# Patient Record
Sex: FEMALE | Race: WHITE | Employment: OTHER | URBAN - METROPOLITAN AREA
[De-identification: names, ages, dates, MRNs, and addresses within clinical notes are randomized per-mention and may not be internally consistent; named-entity substitution may affect disease eponyms.]

---

## 2021-03-25 ENCOUNTER — HOSPITAL ENCOUNTER (INPATIENT)
Age: 66
LOS: 1 days | Discharge: ANOTHER HOSPITAL | DRG: 287 | End: 2021-03-26
Attending: SPECIALIST | Admitting: SPECIALIST
Payer: MEDICARE

## 2021-03-25 ENCOUNTER — APPOINTMENT (OUTPATIENT)
Dept: GENERAL RADIOLOGY | Age: 66
DRG: 287 | End: 2021-03-25
Attending: NURSE PRACTITIONER
Payer: MEDICARE

## 2021-03-25 ENCOUNTER — APPOINTMENT (OUTPATIENT)
Dept: VASCULAR SURGERY | Age: 66
DRG: 287 | End: 2021-03-25
Attending: NURSE PRACTITIONER
Payer: MEDICARE

## 2021-03-25 DIAGNOSIS — R94.39 ABNORMAL STRESS TEST: ICD-10-CM

## 2021-03-25 PROBLEM — I20.0 UNSTABLE ANGINA (HCC): Status: ACTIVE | Noted: 2021-03-25

## 2021-03-25 PROBLEM — I25.10 CAD (CORONARY ARTERY DISEASE): Status: ACTIVE | Noted: 2021-03-25

## 2021-03-25 LAB
APPEARANCE UR: CLEAR
BACTERIA URNS QL MICRO: NEGATIVE /HPF
BILIRUB UR QL: NEGATIVE
COLOR UR: ABNORMAL
EPITH CASTS URNS QL MICRO: ABNORMAL /LPF
GLUCOSE UR STRIP.AUTO-MCNC: NEGATIVE MG/DL
HGB UR QL STRIP: ABNORMAL
HYALINE CASTS URNS QL MICRO: ABNORMAL /LPF (ref 0–5)
KETONES UR QL STRIP.AUTO: NEGATIVE MG/DL
LEUKOCYTE ESTERASE UR QL STRIP.AUTO: ABNORMAL
NITRITE UR QL STRIP.AUTO: NEGATIVE
PH UR STRIP: 5 [PH] (ref 5–8)
PROT UR STRIP-MCNC: NEGATIVE MG/DL
RBC #/AREA URNS HPF: ABNORMAL /HPF (ref 0–5)
SP GR UR REFRACTOMETRY: 1.03 (ref 1–1.03)
UA: UC IF INDICATED,UAUC: ABNORMAL
UROBILINOGEN UR QL STRIP.AUTO: 0.2 EU/DL (ref 0.2–1)
WBC URNS QL MICRO: ABNORMAL /HPF (ref 0–4)

## 2021-03-25 PROCEDURE — 99152 MOD SED SAME PHYS/QHP 5/>YRS: CPT | Performed by: SPECIALIST

## 2021-03-25 PROCEDURE — 81001 URINALYSIS AUTO W/SCOPE: CPT

## 2021-03-25 PROCEDURE — 75756 ARTERY X-RAYS CHEST: CPT | Performed by: SPECIALIST

## 2021-03-25 PROCEDURE — B2151ZZ FLUOROSCOPY OF LEFT HEART USING LOW OSMOLAR CONTRAST: ICD-10-PCS | Performed by: SPECIALIST

## 2021-03-25 PROCEDURE — 93458 L HRT ARTERY/VENTRICLE ANGIO: CPT | Performed by: SPECIALIST

## 2021-03-25 PROCEDURE — 74011000636 HC RX REV CODE- 636: Performed by: SPECIALIST

## 2021-03-25 PROCEDURE — 99153 MOD SED SAME PHYS/QHP EA: CPT | Performed by: SPECIALIST

## 2021-03-25 PROCEDURE — 77030004532 HC CATH ANGI DX IMP BSC -A: Performed by: SPECIALIST

## 2021-03-25 PROCEDURE — 99218 HC RM OBSERVATION: CPT

## 2021-03-25 PROCEDURE — C1894 INTRO/SHEATH, NON-LASER: HCPCS | Performed by: SPECIALIST

## 2021-03-25 PROCEDURE — 71046 X-RAY EXAM CHEST 2 VIEWS: CPT

## 2021-03-25 PROCEDURE — 65660000000 HC RM CCU STEPDOWN

## 2021-03-25 PROCEDURE — 74011250636 HC RX REV CODE- 250/636: Performed by: SPECIALIST

## 2021-03-25 PROCEDURE — C1760 CLOSURE DEV, VASC: HCPCS | Performed by: SPECIALIST

## 2021-03-25 PROCEDURE — 4A023N7 MEASUREMENT OF CARDIAC SAMPLING AND PRESSURE, LEFT HEART, PERCUTANEOUS APPROACH: ICD-10-PCS | Performed by: SPECIALIST

## 2021-03-25 PROCEDURE — 77030003390 HC NDL ANGI MRTM -A: Performed by: SPECIALIST

## 2021-03-25 PROCEDURE — 93880 EXTRACRANIAL BILAT STUDY: CPT

## 2021-03-25 PROCEDURE — B2111ZZ FLUOROSCOPY OF MULTIPLE CORONARY ARTERIES USING LOW OSMOLAR CONTRAST: ICD-10-PCS | Performed by: SPECIALIST

## 2021-03-25 RX ORDER — SODIUM CHLORIDE 0.9 % (FLUSH) 0.9 %
5-40 SYRINGE (ML) INJECTION EVERY 8 HOURS
Status: DISCONTINUED | OUTPATIENT
Start: 2021-03-25 | End: 2021-03-26 | Stop reason: HOSPADM

## 2021-03-25 RX ORDER — ONDANSETRON 2 MG/ML
4 INJECTION INTRAMUSCULAR; INTRAVENOUS
Status: CANCELLED | OUTPATIENT
Start: 2021-03-25 | End: 2021-03-26

## 2021-03-25 RX ORDER — HYDROCORTISONE SODIUM SUCCINATE 100 MG/2ML
100 INJECTION, POWDER, FOR SOLUTION INTRAMUSCULAR; INTRAVENOUS
Status: DISCONTINUED | OUTPATIENT
Start: 2021-03-25 | End: 2021-03-26 | Stop reason: HOSPADM

## 2021-03-25 RX ORDER — DIPHENHYDRAMINE HYDROCHLORIDE 50 MG/ML
25 INJECTION, SOLUTION INTRAMUSCULAR; INTRAVENOUS
Status: DISCONTINUED | OUTPATIENT
Start: 2021-03-25 | End: 2021-03-26 | Stop reason: HOSPADM

## 2021-03-25 RX ORDER — ZOLPIDEM TARTRATE 5 MG/1
5 TABLET ORAL
Status: CANCELLED | OUTPATIENT
Start: 2021-03-25

## 2021-03-25 RX ORDER — GUAIFENESIN 100 MG/5ML
81 LIQUID (ML) ORAL DAILY
COMMUNITY

## 2021-03-25 RX ORDER — FENTANYL CITRATE 50 UG/ML
INJECTION, SOLUTION INTRAMUSCULAR; INTRAVENOUS AS NEEDED
Status: DISCONTINUED | OUTPATIENT
Start: 2021-03-25 | End: 2021-03-25 | Stop reason: HOSPADM

## 2021-03-25 RX ORDER — MIDAZOLAM HYDROCHLORIDE 1 MG/ML
INJECTION, SOLUTION INTRAMUSCULAR; INTRAVENOUS AS NEEDED
Status: DISCONTINUED | OUTPATIENT
Start: 2021-03-25 | End: 2021-03-25 | Stop reason: HOSPADM

## 2021-03-25 RX ORDER — SODIUM CHLORIDE 9 MG/ML
75 INJECTION, SOLUTION INTRAVENOUS ONCE
Status: DISPENSED | OUTPATIENT
Start: 2021-03-25 | End: 2021-03-25

## 2021-03-25 RX ORDER — SODIUM CHLORIDE 0.9 % (FLUSH) 0.9 %
5-40 SYRINGE (ML) INJECTION AS NEEDED
Status: DISCONTINUED | OUTPATIENT
Start: 2021-03-25 | End: 2021-03-26 | Stop reason: HOSPADM

## 2021-03-25 RX ORDER — HEPARIN SODIUM 200 [USP'U]/100ML
INJECTION, SOLUTION INTRAVENOUS
Status: COMPLETED | OUTPATIENT
Start: 2021-03-25 | End: 2021-03-25

## 2021-03-25 RX ORDER — HYDROMORPHONE HYDROCHLORIDE 1 MG/ML
1 INJECTION, SOLUTION INTRAMUSCULAR; INTRAVENOUS; SUBCUTANEOUS
Status: DISCONTINUED | OUTPATIENT
Start: 2021-03-25 | End: 2021-03-26 | Stop reason: HOSPADM

## 2021-03-25 RX ORDER — ZOLPIDEM TARTRATE 5 MG/1
5 TABLET ORAL
Status: DISCONTINUED | OUTPATIENT
Start: 2021-03-25 | End: 2021-03-26 | Stop reason: HOSPADM

## 2021-03-25 RX ORDER — ACETAMINOPHEN 325 MG/1
650 TABLET ORAL
Status: DISCONTINUED | OUTPATIENT
Start: 2021-03-25 | End: 2021-03-26 | Stop reason: HOSPADM

## 2021-03-25 RX ORDER — ACETAMINOPHEN 325 MG/1
650 TABLET ORAL
Status: CANCELLED | OUTPATIENT
Start: 2021-03-25

## 2021-03-25 RX ORDER — HYDROMORPHONE HYDROCHLORIDE 1 MG/ML
1 INJECTION, SOLUTION INTRAMUSCULAR; INTRAVENOUS; SUBCUTANEOUS
Status: CANCELLED | OUTPATIENT
Start: 2021-03-25

## 2021-03-25 RX ORDER — SODIUM CHLORIDE 9 MG/ML
75 INJECTION, SOLUTION INTRAVENOUS CONTINUOUS
Status: DISPENSED | OUTPATIENT
Start: 2021-03-25 | End: 2021-03-25

## 2021-03-25 RX ORDER — CHLORHEXIDINE GLUCONATE 1.2 MG/ML
15 RINSE ORAL EVERY 12 HOURS
Status: CANCELLED | OUTPATIENT
Start: 2021-03-27

## 2021-03-25 RX ORDER — METOPROLOL TARTRATE 25 MG/1
25 TABLET, FILM COATED ORAL 2 TIMES DAILY
Status: ON HOLD | COMMUNITY
End: 2021-04-05 | Stop reason: SDUPTHER

## 2021-03-25 RX ORDER — SODIUM CHLORIDE 0.9 % (FLUSH) 0.9 %
5-40 SYRINGE (ML) INJECTION AS NEEDED
Status: CANCELLED | OUTPATIENT
Start: 2021-03-25

## 2021-03-25 RX ORDER — ONDANSETRON 2 MG/ML
4 INJECTION INTRAMUSCULAR; INTRAVENOUS
Status: DISCONTINUED | OUTPATIENT
Start: 2021-03-25 | End: 2021-03-26 | Stop reason: HOSPADM

## 2021-03-25 RX ORDER — ATORVASTATIN CALCIUM 20 MG/1
40 TABLET, FILM COATED ORAL DAILY
COMMUNITY
End: 2021-06-24

## 2021-03-25 RX ORDER — SODIUM CHLORIDE 0.9 % (FLUSH) 0.9 %
5-40 SYRINGE (ML) INJECTION EVERY 8 HOURS
Status: CANCELLED | OUTPATIENT
Start: 2021-03-25

## 2021-03-25 RX ORDER — AMIODARONE HYDROCHLORIDE 200 MG/1
400 TABLET ORAL EVERY 12 HOURS
Status: CANCELLED | OUTPATIENT
Start: 2021-03-25

## 2021-03-25 RX ADMIN — SODIUM CHLORIDE 75 ML/HR: 9 INJECTION, SOLUTION INTRAVENOUS at 20:15

## 2021-03-25 NOTE — PROGRESS NOTES
11:48 AM  Patient arrived. ID and allergies verified verbally with patient. Pt voices understanding of procedure to be performed. Consent obtained. Pt prepped for procedure. 1:02 PM  TRANSFER - OUT REPORT:    Verbal report given to George Garcia RN on [facility]  being transferred to Cath Lab for ordered procedure       Report consisted of patients Situation, Background, Assessment and   Recommendations(SBAR). Information from the following report(s) SBAR was reviewed with the receiving nurse. Lines:   Peripheral IV 03/25/21 Left Antecubital (Active)   Site Assessment Clean, dry, & intact 03/25/21 1202   Phlebitis Assessment 0 03/25/21 1202   Infiltration Assessment 0 03/25/21 1202   Dressing Status Clean, dry, & intact 03/25/21 1202   Dressing Type Transparent 03/25/21 1202   Hub Color/Line Status Pink 03/25/21 1202        Opportunity for questions and clarification was provided. Patient transported with:   Registered Nurse      1:38 PM  TRANSFER - IN REPORT:    Verbal report received from Eden Arzola, [facility] on [facility]  being received from Cath Lab for routine progression of care      Report consisted of patients Situation, Background, Assessment and   Recommendations(SBAR). Information from the following report(s) SBAR was reviewed with the receiving nurse. Opportunity for questions and clarification was provided. Assessment completed upon patients arrival to unit and care assumed. 4:00 PM  30 minute pre-call given to [facility] charge nurse. 4:05PM  Over the phone verbal report given to [address] [facility]. 4:20 PM  TRANSFER - OUT REPORT:    Verbal report given to [address], RN on [facility]  being transferred to [facility] room 325 for routine progression of care       Report consisted of patients Situation, Background, Assessment and   Recommendations(SBAR). Information from the following report(s) SBAR was reviewed with the receiving nurse.     Lines:   Peripheral IV 03/25/21 Left Antecubital (Active)   Site Assessment Clean, dry, & intact 03/25/21 1202   Phlebitis Assessment 0 03/25/21 1202   Infiltration Assessment 0 03/25/21 1202   Dressing Status Clean, dry, & intact 03/25/21 1202   Dressing Type Transparent 03/25/21 1202   Hub Color/Line Status Pink 03/25/21 1202        Opportunity for questions and clarification was provided.       Patient transported with:   Registered Nurse

## 2021-03-25 NOTE — H&P
Date of Surgery Update:  Juan Johnson was seen and examined. History and physical has been reviewed. The patient has been examined. There have been no significant clinical changes since the completion of the originally dated History and Physical.    Signed By: Carla Bone MD     March 25, 2021 12:16 PM       No CP, + SOB, palps  Exercise Echo (3/17/21):  Sept. Inf, post HK at rest and with peak. Viri Rosado  1955   Office/Outpatient Visit  Visit Date: Jeneane Gaucher 03:50 pm  Provider: Radames Eubanks MD   [facility]: [facility] ([facility])- [address] [place] [address] Kavya Alves. [id number] [phone]    Electronically signed by Darinel Nickerson MD on  03/17/2021 04:09:11 PM                           Subjective:    CC: Ms. Caitlin Leonard is a 76year old female. Her primary care physician is Corinna Laboy NP. She is here to follow up with the doctor regarding previous testing, stress echocardiogram - 3/17/21 which was abnormal.      HPI:       Cardiac murmur, unspecified noted. Coronary Artery Disease: Current symptoms include shortness of breath. MD Notes: Stress echo today showed septal, inferior, and posterior hypokinesis. EF 40%. Abnormal result of other cardiovascular function study noted. MD Notes: 3/10/21 Sinus, inferior Q and T wave inversion. Abnormal electrocardiogram [ECG] [EKG] noted. Paplitations/Arrhythmias:  Regarding cardiac arrhythmia (unspecified): She describes the sensation as occasional skipped beats. She cannot estimate the frequency of symptoms. The average duration of each episode is a few seconds. She states that it is worsened after exertion. Nothing seems to alleviate the palpitations. Associated symptoms include shortness of breath. She denies dizziness or syncope.     Past Medical History / Family History / Social History:     Last Reviewed on 3/17/2021 04:07 PM by Jennifer Williamson  Past Medical History:     Hyperlipidemia Surgical History:   Surgical/Procedural History:   Cholecystectomy     Family History:   Father:    Mother: ;  arrhythmia (Afib); congestive heart failure; coronary artery disease; hypertension   Brother(s): Healthy; 2 brother(s) total     Social History:   Social History:   Occupation: Retired (Prior occupation: Retail)   Marital Status:    Children: 2 children     Tobacco/Alcohol/Supplements:   Last Reviewed on 3/17/2021 04:07 PM by Sea Contreras  TOBACCO/ALCOHOL/SUPPLEMENTS   Tobacco: She has never smoked. Alcohol: Non-drinker   Caffeine:  She admits to consuming caffeine via soda ( 3 servings per week ). Substance Abuse History:   Last Reviewed on 3/17/2021 04:07 PM by Sea Contreras  Substance Use/Abuse:   None     Mental Health History:   Last Reviewed on 3/17/2021 04:07 PM by Rekha Santoro (eg STDs): Last Reviewed on 3/17/2021 04:07 PM by Sea Contreras    Current Problems:   Last Reviewed on 3/17/2021 04:07 PM by Sea Contreras  Abnormal electrocardiogram [ECG] [EKG]  Cardiac murmur, unspecified  Shortness of breath  Other hyperlipidemia  Palpitations  Cardiac murmur  Shortness of breath  Palpitations  Atherosclerotic heart disease of native coronary artery with other forms of angina pectoris  Abnormal result of other cardiovascular function study    Allergies:   Last Reviewed on 3/17/2021 04:07 PM by Sea Contreras  No Known Allergies.     Current Medications:   Last Reviewed on 3/17/2021 04:07 PM by Sea Contreras  multivitamin oral tablet  atorvastatin 20 mg oral tablet [take 1 tablet (20 mg) by oral route once daily]  aspirin 81 mg oral tablet,chewable [chew 1 tablet (81 mg) by oral route once daily]  metoprolol tartrate 25 mg oral tablet [take 1 tablet (25 mg) by oral route 2 times per day]    Objective:    Vitals:     Current: 3/17/2021 3:59:04 PM  Ht:  5 ft, 4 inBP: 150/82 mm Hg (left arm, sitting);  P: 84 bpm    Exams:   GENERAL:  Alert, oriented to person, place and time. HEENT:  Pinkish palpebral  conjunctivae. Anicteric sclerae. NECK:  No jugular vein engorgement. No bruit. CHEST: Equal expansion. Clear breath sounds. No rales, no wheezing. Heart: Reg rate and rhythm. Grade 2/6 systolic ejection murmur at the left sternal border area and at the aortic area. ABDOMEN:  Soft. Normal active bowel sounds. No tenderness. EXTREMITIES:  No pitting pedal edema. Equal pulses bilaterally. NEURO:  Grossly intact. Assessment:     R01.1   Cardiac murmur, unspecified     I25.118   Atherosclerotic heart disease of native coronary artery with other forms of angina pectoris     R94.39   Abnormal result of other cardiovascular function study     R94.31   Abnormal electrocardiogram [ECG] [EKG]     R00.2   Palpitations       ORDERS:     Procedures Ordered:     [id number]  Education and train for pt self-mgmt by qualified, nonphysician, jim 30 minutes; individual pt  (Send-Out)          XCATH  Cardiac Cath  (In-House)          Other Orders:     [id number]  Queried Patient for Tobacco Use  (Send-Out)              Plan:     Atherosclerotic heart disease of native coronary artery with other forms of angina pectoris  1. Medication list has been reviewed. Start the following medication(s):  Aspirin 81 mg. Take one tablet once daily with food. Metoprolol. .      Smoking Status:  Nonsmoker   2. Advised the patient regarding diet, exercise, and lifestyle modification. 3.  The patient to call the office if there is any change in her cardiac symptoms. 4.  Explained to the patient the importance of controlling her cardiac risk factors. Testing/Procedures: Cardiac Catheterization  Explained to the patient the indication, procedure, risks, and benefits of cardiac catheterization. The patient understands  and wishes to proceed with the cath to be performed as an outpatient in one week at [facility] by Dr. Hardik Tiwari.       Schedule a follow up appointment in 2 weeks. The above note was transcribed by Bridger Myers and authenticated by Dr. Desi Navarro prior to sign off.

## 2021-03-25 NOTE — PROGRESS NOTES
CSS update: CSS: pending transfer to [address] [place] [address] for surgical workup and treatment. Will order some of preop workup at [facility] while awaiting for bed assignment. Signed & held orders are for [address] [place] [address] admit, please do not release. Full note to follow at [address] [place] [address].

## 2021-03-25 NOTE — CARDIO/PULMONARY
[facility] Cardiopulmonary Rehab: 75 yo female admitted for cardiac cath (3/25). S/P cath with obstructive 2VD. LVEF 40% by stress echo (3/17/21). Cardiologist is Dr Arianna Berrios.     Met with patient on CLPO. Her sister-in-law was also present. Pt reported she relocated from [place], four weeks ago. She is a retired  with [employer]. Pt is living with her brother & sister-in-law in [place]. CAD risk factors: HLD, family hx, and post-menopausal.  Pt aware of plan for transfer to [facility]. She indicated she was feeling overwhelmed with the surprise of needing surgery. Offered emotional support. Briefly discussed importance of outpatient cardiac rehab program. Pt expressed interest in participating in program at [facility].

## 2021-03-25 NOTE — Clinical Note
TRANSFER - IN REPORT:     Verbal report received from: SILVESTRE. Report consisted of patient's Situation, Background, Assessment and   Recommendations(SBAR). Opportunity for questions and clarification was provided. Assessment completed upon patient's arrival to unit and care assumed. Patient transported with a Registered Nurse.

## 2021-03-25 NOTE — Clinical Note
TRANSFER - OUT REPORT:     Verbal report given to: Yulisa Ca. Report consisted of patient's Situation, Background, Assessment and   Recommendations(SBAR). Opportunity for questions and clarification was provided. Patient transported with a Registered Nurse. Patient transported to: Gigi Drew.

## 2021-03-25 NOTE — PROCEDURES
Cath:  Obstructive 2VD     LM 30     LAD m100; D1 90     OM2 50     RCA ost50, p95, N953  Mild LV systolic dysfunction     EF 45%     Inferobasal AK  No AVG/MR  Patent L SC.  RFA angioseal.    CTSx consult for CABG

## 2021-03-26 ENCOUNTER — HOSPITAL ENCOUNTER (INPATIENT)
Age: 66
LOS: 10 days | Discharge: HOME HEALTH | DRG: 236 | End: 2021-04-05
Attending: THORACIC SURGERY (CARDIOTHORACIC VASCULAR SURGERY) | Admitting: THORACIC SURGERY (CARDIOTHORACIC VASCULAR SURGERY)
Payer: MEDICARE

## 2021-03-26 ENCOUNTER — APPOINTMENT (OUTPATIENT)
Dept: GENERAL RADIOLOGY | Age: 66
DRG: 236 | End: 2021-03-26
Attending: THORACIC SURGERY (CARDIOTHORACIC VASCULAR SURGERY)
Payer: MEDICARE

## 2021-03-26 ENCOUNTER — APPOINTMENT (OUTPATIENT)
Dept: GENERAL RADIOLOGY | Age: 66
DRG: 236 | End: 2021-03-26
Attending: NURSE PRACTITIONER
Payer: MEDICARE

## 2021-03-26 VITALS
OXYGEN SATURATION: 94 % | WEIGHT: 163.1 LBS | RESPIRATION RATE: 16 BRPM | HEART RATE: 69 BPM | DIASTOLIC BLOOD PRESSURE: 68 MMHG | HEIGHT: 64 IN | BODY MASS INDEX: 27.84 KG/M2 | SYSTOLIC BLOOD PRESSURE: 120 MMHG | TEMPERATURE: 98.4 F

## 2021-03-26 DIAGNOSIS — I25.10 CORONARY ARTERY DISEASE DUE TO LIPID RICH PLAQUE: ICD-10-CM

## 2021-03-26 DIAGNOSIS — R73.9 HYPERGLYCEMIA: ICD-10-CM

## 2021-03-26 DIAGNOSIS — I25.119 CORONARY ARTERY DISEASE INVOLVING NATIVE HEART WITH ANGINA PECTORIS, UNSPECIFIED VESSEL OR LESION TYPE (HCC): ICD-10-CM

## 2021-03-26 DIAGNOSIS — I25.83 CORONARY ARTERY DISEASE DUE TO LIPID RICH PLAQUE: ICD-10-CM

## 2021-03-26 PROBLEM — E78.5 HLD (HYPERLIPIDEMIA): Status: ACTIVE | Noted: 2021-03-26

## 2021-03-26 LAB
ALBUMIN SERPL-MCNC: 3.3 G/DL (ref 3.5–5)
ALBUMIN/GLOB SERPL: 1.1 {RATIO} (ref 1.1–2.2)
ALP SERPL-CCNC: 58 U/L (ref 45–117)
ALT SERPL-CCNC: 24 U/L (ref 12–78)
ANION GAP SERPL CALC-SCNC: 5 MMOL/L (ref 5–15)
APPEARANCE UR: CLEAR
APTT PPP: 26.2 SEC (ref 22.1–31)
AST SERPL-CCNC: 15 U/L (ref 15–37)
ATRIAL RATE: 0 BPM
ATRIAL RATE: 62 BPM
BACTERIA URNS QL MICRO: ABNORMAL /HPF
BILIRUB SERPL-MCNC: 0.5 MG/DL (ref 0.2–1)
BILIRUB UR QL: NEGATIVE
BNP SERPL-MCNC: 367 PG/ML
BUN SERPL-MCNC: 17 MG/DL (ref 6–20)
BUN/CREAT SERPL: 25 (ref 12–20)
CALCIUM SERPL-MCNC: 8.2 MG/DL (ref 8.5–10.1)
CALCULATED P AXIS, ECG09: 45 DEGREES
CALCULATED R AXIS, ECG10: -15 DEGREES
CALCULATED R AXIS, ECG10: 0 DEGREES
CALCULATED T AXIS, ECG11: 0 DEGREES
CALCULATED T AXIS, ECG11: 13 DEGREES
CHLORIDE SERPL-SCNC: 110 MMOL/L (ref 97–108)
CO2 SERPL-SCNC: 26 MMOL/L (ref 21–32)
COLOR UR: ABNORMAL
CREAT SERPL-MCNC: 0.68 MG/DL (ref 0.55–1.02)
DIAGNOSIS, 93000: NORMAL
DIAGNOSIS, 93000: NORMAL
EPITH CASTS URNS QL MICRO: ABNORMAL /LPF
ERYTHROCYTE [DISTWIDTH] IN BLOOD BY AUTOMATED COUNT: 13.1 % (ref 11.5–14.5)
EST. AVERAGE GLUCOSE BLD GHB EST-MCNC: 103 MG/DL
GLOBULIN SER CALC-MCNC: 3.1 G/DL (ref 2–4)
GLUCOSE SERPL-MCNC: 94 MG/DL (ref 65–100)
GLUCOSE UR STRIP.AUTO-MCNC: NEGATIVE MG/DL
HBA1C MFR BLD: 5.2 % (ref 4–5.6)
HCT VFR BLD AUTO: 40.1 % (ref 35–47)
HGB BLD-MCNC: 13.1 G/DL (ref 11.5–16)
HGB UR QL STRIP: NEGATIVE
INR PPP: 1.1 (ref 0.9–1.1)
KETONES UR QL STRIP.AUTO: 15 MG/DL
LEFT CCA DIST DIAS: 33.1 CM/S
LEFT CCA DIST SYS: 93.9 CM/S
LEFT CCA PROX DIAS: 28 CM/S
LEFT CCA PROX SYS: 101.7 CM/S
LEFT ECA DIAS: 16.3 CM/S
LEFT ECA SYS: 105.6 CM/S
LEFT ICA DIST DIAS: 35.7 CM/S
LEFT ICA DIST SYS: 83.6 CM/S
LEFT ICA MID DIAS: 38.3 CM/S
LEFT ICA MID SYS: 91.3 CM/S
LEFT ICA PROX DIAS: 26.7 CM/S
LEFT ICA PROX SYS: 79.7 CM/S
LEFT ICA/CCA SYS: 0.97
LEFT SUBCLAVIAN DIAS: 0 CM/S
LEFT SUBCLAVIAN SYS: 162.4 CM/S
LEFT VERTEBRAL DIAS: 14.48 CM/S
LEFT VERTEBRAL SYS: 54.6 CM/S
LEUKOCYTE ESTERASE UR QL STRIP.AUTO: ABNORMAL
MAGNESIUM SERPL-MCNC: 2.2 MG/DL (ref 1.6–2.4)
MCH RBC QN AUTO: 29.4 PG (ref 26–34)
MCHC RBC AUTO-ENTMCNC: 32.7 G/DL (ref 30–36.5)
MCV RBC AUTO: 89.9 FL (ref 80–99)
NITRITE UR QL STRIP.AUTO: NEGATIVE
NRBC # BLD: 0 K/UL (ref 0–0.01)
NRBC BLD-RTO: 0 PER 100 WBC
P-R INTERVAL, ECG05: 164 MS
PH UR STRIP: 5 [PH] (ref 5–8)
PLATELET # BLD AUTO: 174 K/UL (ref 150–400)
PMV BLD AUTO: 9.5 FL (ref 8.9–12.9)
POTASSIUM SERPL-SCNC: 3.8 MMOL/L (ref 3.5–5.1)
PROT SERPL-MCNC: 6.4 G/DL (ref 6.4–8.2)
PROT UR STRIP-MCNC: NEGATIVE MG/DL
PROTHROMBIN TIME: 10.9 SEC (ref 9–11.1)
Q-T INTERVAL, ECG07: 0 MS
Q-T INTERVAL, ECG07: 422 MS
QRS DURATION, ECG06: 0 MS
QRS DURATION, ECG06: 94 MS
QTC CALCULATION (BEZET), ECG08: 0 MS
QTC CALCULATION (BEZET), ECG08: 428 MS
RBC # BLD AUTO: 4.46 M/UL (ref 3.8–5.2)
RBC #/AREA URNS HPF: ABNORMAL /HPF (ref 0–5)
RIGHT CCA DIST DIAS: 36.1 CM/S
RIGHT CCA DIST SYS: 94.3 CM/S
RIGHT CCA PROX DIAS: 17.5 CM/S
RIGHT CCA PROX SYS: 100.3 CM/S
RIGHT ECA DIAS: 12.42 CM/S
RIGHT ECA SYS: 94 CM/S
RIGHT ICA DIST DIAS: 34.4 CM/S
RIGHT ICA DIST SYS: 84.9 CM/S
RIGHT ICA MID DIAS: 43.5 CM/S
RIGHT ICA MID SYS: 104.3 CM/S
RIGHT ICA PROX DIAS: 29.3 CM/S
RIGHT ICA PROX SYS: 73.8 CM/S
RIGHT ICA/CCA SYS: 1.1
RIGHT SUBCLAVIAN DIAS: 0 CM/S
RIGHT SUBCLAVIAN SYS: 122 CM/S
RIGHT VERTEBRAL DIAS: 19.64 CM/S
RIGHT VERTEBRAL SYS: 57.4 CM/S
SODIUM SERPL-SCNC: 141 MMOL/L (ref 136–145)
SP GR UR REFRACTOMETRY: 1.02 (ref 1–1.03)
THERAPEUTIC RANGE,PTTT: NORMAL SECS (ref 58–77)
TSH SERPL DL<=0.05 MIU/L-ACNC: 2.76 UIU/ML (ref 0.36–3.74)
UA: UC IF INDICATED,UAUC: ABNORMAL
UROBILINOGEN UR QL STRIP.AUTO: 1 EU/DL (ref 0.2–1)
VENTRICULAR RATE, ECG03: 0 BPM
VENTRICULAR RATE, ECG03: 62 BPM
WBC # BLD AUTO: 5.8 K/UL (ref 3.6–11)
WBC URNS QL MICRO: ABNORMAL /HPF (ref 0–4)

## 2021-03-26 PROCEDURE — 83880 ASSAY OF NATRIURETIC PEPTIDE: CPT

## 2021-03-26 PROCEDURE — 83036 HEMOGLOBIN GLYCOSYLATED A1C: CPT

## 2021-03-26 PROCEDURE — 77030027138 HC INCENT SPIROMETER -A

## 2021-03-26 PROCEDURE — 83735 ASSAY OF MAGNESIUM: CPT

## 2021-03-26 PROCEDURE — 85610 PROTHROMBIN TIME: CPT

## 2021-03-26 PROCEDURE — 85730 THROMBOPLASTIN TIME PARTIAL: CPT

## 2021-03-26 PROCEDURE — 80053 COMPREHEN METABOLIC PANEL: CPT

## 2021-03-26 PROCEDURE — 85027 COMPLETE CBC AUTOMATED: CPT

## 2021-03-26 PROCEDURE — 74011250637 HC RX REV CODE- 250/637: Performed by: NURSE PRACTITIONER

## 2021-03-26 PROCEDURE — 65660000001 HC RM ICU INTERMED STEPDOWN

## 2021-03-26 PROCEDURE — 81001 URINALYSIS AUTO W/SCOPE: CPT

## 2021-03-26 PROCEDURE — 71046 X-RAY EXAM CHEST 2 VIEWS: CPT

## 2021-03-26 PROCEDURE — 84443 ASSAY THYROID STIM HORMONE: CPT

## 2021-03-26 PROCEDURE — 36415 COLL VENOUS BLD VENIPUNCTURE: CPT

## 2021-03-26 PROCEDURE — 99223 1ST HOSP IP/OBS HIGH 75: CPT | Performed by: THORACIC SURGERY (CARDIOTHORACIC VASCULAR SURGERY)

## 2021-03-26 PROCEDURE — 93005 ELECTROCARDIOGRAM TRACING: CPT

## 2021-03-26 PROCEDURE — G9480 ADMISSION TO MCCM: HCPCS | Performed by: THORACIC SURGERY (CARDIOTHORACIC VASCULAR SURGERY)

## 2021-03-26 RX ORDER — HYDROMORPHONE HYDROCHLORIDE 1 MG/ML
1 INJECTION, SOLUTION INTRAMUSCULAR; INTRAVENOUS; SUBCUTANEOUS
Status: DISCONTINUED | OUTPATIENT
Start: 2021-03-26 | End: 2021-03-26

## 2021-03-26 RX ORDER — METOPROLOL TARTRATE 25 MG/1
25 TABLET, FILM COATED ORAL 2 TIMES DAILY
Status: DISCONTINUED | OUTPATIENT
Start: 2021-03-26 | End: 2021-03-30

## 2021-03-26 RX ORDER — ONDANSETRON 2 MG/ML
4 INJECTION INTRAMUSCULAR; INTRAVENOUS
Status: ACTIVE | OUTPATIENT
Start: 2021-03-26 | End: 2021-03-27

## 2021-03-26 RX ORDER — SODIUM CHLORIDE 0.9 % (FLUSH) 0.9 %
5-40 SYRINGE (ML) INJECTION EVERY 8 HOURS
Status: DISCONTINUED | OUTPATIENT
Start: 2021-03-26 | End: 2021-03-30 | Stop reason: HOSPADM

## 2021-03-26 RX ORDER — SODIUM CHLORIDE 0.9 % (FLUSH) 0.9 %
5-40 SYRINGE (ML) INJECTION AS NEEDED
Status: DISCONTINUED | OUTPATIENT
Start: 2021-03-26 | End: 2021-03-30 | Stop reason: HOSPADM

## 2021-03-26 RX ORDER — METOPROLOL TARTRATE 25 MG/1
25 TABLET, FILM COATED ORAL 2 TIMES DAILY
Status: DISCONTINUED | OUTPATIENT
Start: 2021-03-26 | End: 2021-03-26 | Stop reason: HOSPADM

## 2021-03-26 RX ORDER — MUPIROCIN 20 MG/G
OINTMENT TOPICAL 2 TIMES DAILY
Status: DISCONTINUED | OUTPATIENT
Start: 2021-03-28 | End: 2021-03-30

## 2021-03-26 RX ORDER — GUAIFENESIN 100 MG/5ML
81 LIQUID (ML) ORAL DAILY
Status: DISCONTINUED | OUTPATIENT
Start: 2021-03-27 | End: 2021-03-30

## 2021-03-26 RX ORDER — ACETAMINOPHEN 325 MG/1
650 TABLET ORAL
Status: DISCONTINUED | OUTPATIENT
Start: 2021-03-26 | End: 2021-03-30

## 2021-03-26 RX ORDER — AMIODARONE HYDROCHLORIDE 200 MG/1
400 TABLET ORAL EVERY 12 HOURS
Status: DISCONTINUED | OUTPATIENT
Start: 2021-03-26 | End: 2021-03-30 | Stop reason: HOSPADM

## 2021-03-26 RX ORDER — ATORVASTATIN CALCIUM 40 MG/1
40 TABLET, FILM COATED ORAL DAILY
Status: DISCONTINUED | OUTPATIENT
Start: 2021-03-27 | End: 2021-04-05 | Stop reason: HOSPADM

## 2021-03-26 RX ORDER — THERA TABS 400 MCG
1 TAB ORAL DAILY
Status: DISCONTINUED | OUTPATIENT
Start: 2021-03-27 | End: 2021-03-30

## 2021-03-26 RX ORDER — GUAIFENESIN 100 MG/5ML
81 LIQUID (ML) ORAL DAILY
Status: DISCONTINUED | OUTPATIENT
Start: 2021-03-26 | End: 2021-03-26 | Stop reason: HOSPADM

## 2021-03-26 RX ORDER — CHLORHEXIDINE GLUCONATE 1.2 MG/ML
15 RINSE ORAL EVERY 12 HOURS
Status: DISCONTINUED | OUTPATIENT
Start: 2021-03-27 | End: 2021-03-30

## 2021-03-26 RX ORDER — THERA TABS 400 MCG
1 TAB ORAL DAILY
COMMUNITY

## 2021-03-26 RX ORDER — ATORVASTATIN CALCIUM 20 MG/1
20 TABLET, FILM COATED ORAL DAILY
Status: DISCONTINUED | OUTPATIENT
Start: 2021-03-26 | End: 2021-03-26 | Stop reason: HOSPADM

## 2021-03-26 RX ORDER — ZOLPIDEM TARTRATE 5 MG/1
5 TABLET ORAL
Status: DISCONTINUED | OUTPATIENT
Start: 2021-03-26 | End: 2021-03-30

## 2021-03-26 RX ADMIN — Medication 10 ML: at 05:10

## 2021-03-26 RX ADMIN — Medication 10 ML: at 17:04

## 2021-03-26 RX ADMIN — METOPROLOL TARTRATE 25 MG: 25 TABLET, FILM COATED ORAL at 17:42

## 2021-03-26 RX ADMIN — AMIODARONE HYDROCHLORIDE 400 MG: 200 TABLET ORAL at 13:24

## 2021-03-26 RX ADMIN — AMIODARONE HYDROCHLORIDE 400 MG: 200 TABLET ORAL at 21:04

## 2021-03-26 RX ADMIN — Medication 10 ML: at 21:05

## 2021-03-26 NOTE — PROGRESS NOTES
Transitions of Care Plan:   RUR: 8%   CABG next week   Baseline: Independent without DME; resides with brother & sister in law   Disposition: home with home health - referral sent to [facility]    Reason for Admission:  CABG         RUR Score:  8%                   Plan for utilizing home health:      [facility]    PCP: First and Last name:  Sherman Morrison NP     Name of Practice:    Are you a current patient: Yes/No: Yes   Approximate date of last visit: March 2021   Can you participate in a virtual visit with your PCP: Yes                    Current Advanced Directive/Advance Care Plan: Full Code      Healthcare Decision Maker:   Click here to complete [address] including selection of the Healthcare Decision Maker Relationship (ie \"Primary\")                       Transition of Care Plan:                      CM spoke with patient and sister-in-law at bedside to discuss initial baseline assessment and discharge plan:    Baseline assessment:  1)  ADLs, self-care, orientation - independent without DME; AOx4  2) Social - resides with her sister-in-law and brother; recently moved to [place] after passing of her  and eldest daughter; single story home with no entry steps  3)  [address] [facility]    Disposition:  Home with home health - referral sent to [facility] post CABG protocol. Patient is agreeable to [facility]. CM will continue to follow. Margie Seymour, MPH    Care Management Interventions  PCP Verified by CM: Yes  Palliative Care Criteria Met (RRAT>21 & CHF Dx)?: No  Mode of Transport at Discharge:  Other (see comment)(Sister-in-law, private car)  Transition of Care Consult (CM Consult): [address]: No  Discharge Durable Medical Equipment: No  Health Maintenance Reviewed: Yes  Physical Therapy Consult: Yes  Occupational Therapy Consult: Yes  Speech Therapy Consult: Yes  Current Support Network: Relative's Home(Brother and sister-in-law)  Confirm Follow Up Transport: Family  The Plan for Transition of Care is Related to the Following Treatment Goals : Home Health  The Patient and/or Patient Representative was Provided with a Choice of Provider and Agrees with the Discharge Plan?: Yes  Name of the Patient Representative Who was Provided with a Choice of Provider and Agrees with the Discharge Plan: Harper Santillan  [place] of Choice List was Provided with Basic Dialogue that Supports the Patient's Individualized Plan of Care/Goals, Treatment Preferences and Shares the Quality Data Associated with the Providers?: Yes  Discharge Location  Discharge Placement: Home with home health

## 2021-03-26 NOTE — PROGRESS NOTES
0730 Bedside and Verbal shift change report given to April Jihan Pool RN (oncoming nurse) by Violette Maciel RN (offgoing nurse). Report included the following information SBAR and Kardex. This patient was assisted with Intentional Toileting every 2 hours during this shift. Documentation of ambulation and output reflected on Flowsheet. TRANSFER - OUT REPORT:    Verbal report given to Kandy Ashley RN (name) on Anca Naylor  being transferred to [address] [place] [address] (unit) for ordered procedure       Report consisted of patients Situation, Background, Assessment and   Recommendations(SBAR). Information from the following report(s) SBAR, Kardex and Cardiac Rhythm NSR was reviewed with the receiving nurse. Lines:   Peripheral IV 03/25/21 Left Antecubital (Active)   Site Assessment Clean, dry, & intact 03/26/21 0832   Phlebitis Assessment 0 03/26/21 0832   Infiltration Assessment 0 03/26/21 0832   Dressing Status Clean, dry, & intact; Occlusive 03/26/21 0832   Dressing Type Transparent;Tape 03/26/21 0832   Hub Color/Line Status Pink;Capped 03/26/21 9550   Action Taken Open ports on tubing capped 03/26/21 0832   Alcohol Cap Used Yes 03/26/21 7211        Opportunity for questions and clarification was provided. Patient transported with:   Elaine Bergeron Patient signed Tianna Ort, belongings of glasses, cell phone, charge and bag at bedside.

## 2021-03-26 NOTE — CARDIO/PULMONARY
Cardiac Rehab: Consult received and chart reviewed. No CTS note yet so plan not yet known thus pre-op education deferred. At nurse's request, provided floor nurse with CABG education folder for Shawn Singh. Will continue to follow.  Mildred Houston RN

## 2021-03-26 NOTE — PROGRESS NOTES
Toma Johnson MD, [address], [place] [address]  [phone]      IMPRESSION and RECOMMENDATIONS     1.  CAD:  Obstructive 2VD, mild LV dysfunction. For CABG. Transfer to [facility] likely today. Cont ASA/statin, BB. Ultimately f/u with Dr. Grace Kline. Subjective:       No complaints. Worried. Objective:     Patient Vitals for the past 16 hrs:   BP Temp Pulse Resp SpO2   03/26/21 0807 120/68 98.4 °F (36.9 °C) 69 16 94 %   03/26/21 0700 -- -- (!) 56 -- --   03/26/21 0315 121/65 97.7 °F (36.5 °C) 66 17 93 %   03/25/21 2338 102/61 98 °F (36.7 °C) 62 17 93 %   03/25/21 2301 -- -- 62 -- --   03/25/21 1958 (!) 112/50 97.9 °F (36.6 °C) 63 17 95 %   03/25/21 1716 133/75 97.9 °F (36.6 °C) 62 18 93 %       HEENT Exam:     WNL         Lung Exam:     The patient is not dyspneic. Breath sounds are heard equally in all lung fields. There are no wheezes, rales, rhonchi, or rubs heard on auscultation. Heart Exam:     The rhythm is regular. The PMI is in the 5th intercostal space of the MCL. Apical impulse is normal. S1 is regular. S2 is physiologic. There is no S3, S4 gallop, murmur, click, or rub. Abdomen Exam:     Benign. Extremities Exam:     No cyanosis, clubbing, edema. Distal pulses intact. Lab Results   Component Value Date/Time    Glucose 94 03/26/2021 05:12 AM    Sodium 141 03/26/2021 05:12 AM    Potassium 3.8 03/26/2021 05:12 AM    Chloride 110 (H) 03/26/2021 05:12 AM    CO2 26 03/26/2021 05:12 AM    BUN 17 03/26/2021 05:12 AM    Creatinine 0.68 03/26/2021 05:12 AM    Calcium 8.2 (L) 03/26/2021 05:12 AM     Recent Labs     03/26/21  0512   WBC 5.8   HGB 13.1   HCT 40.1        Recent Labs     03/26/21  0512   ALT 24   AP 58   TBILI 0.5   TP 6.4   ALB 3.3*   GLOB 3.1     Recent Labs     03/26/21  0512   INR 1.1   PTP 10.9   APTT 26.2      No results for input(s): CPK, CKMB, TNIPOC in the last 72 hours.     No lab exists for component: TROPONINI, ITNL  No results for input(s): TROIQ in the last 72 hours.   No results found for: CHOL, CHOLX, CHLST, CHOLV, HDL, HDLP, LDL, LDLC, DLDLP, TGLX, TRIGL, TRIGP, CHHD, CHHDX

## 2021-03-26 NOTE — PROGRESS NOTES
1900 Shift change:     Bedside and Verbal shift change report given to [address] [place] [address] (oncoming nurse) by Pravin Cee RN (offgoing nurse). Report included the following information SBAR, Kardex, Intake/Output, MAR, and Recent Results. Shift Summary: This patient was assisted with Intentional Toileting every 2 hours during this shift. Documentation of ambulation and output reflected on Flowsheet. 0730 Shift change:     Bedside and Verbal shift change report given to April B RN (oncoming nurse) by [address] [place] [address] (offgoing nurse). Report included the following information SBAR, Kardex, Intake/Output, MAR, and Recent Results.

## 2021-03-26 NOTE — H&P
[address] [facility]  HISTORY AND PHYSICAL    Name:  Lesli Fermin  MR#:  [id number]  :  1955  ACCOUNT #:  [de-identified]  ADMIT DATE:  2021    HISTORY OF PRESENT ILLNESS:  The patient is a 79-year-old female who was referred by Dr. Nba Seth following a cardiac catheterization at [facility] for surgical coronary artery bypass grafting. Her present history is that she recently moved from [place] and noticed progressive shortness of breath while walking and underwent an evaluation including a noninvasive stress echo which was positive for ischemia. Subsequently, she underwent cardiac catheterization on 2021, which revealed severe multivessel coronary artery disease and moderate depression of left ventricular systolic function. She denies any chest pain or orthopnea. No peripheral edema. She has had no previous cardiovascular history. Social history, she is retired, a recent . Has no family history of coronary disease, however, she reports having a benign murmur diagnosed in childhood. Review of her cardiac catheterization shows severe multivessel disease with occlusion of her left main coronary artery that fills via collaterals and significant disease in the ostium of the right and circumflex marginal coronary arteries. She has preserved left ventricular systolic function. PAST MEDICAL HISTORY:  Her other past history includes hyperlipidemia and history of pyloric stenosis. She underwent surgery for that as an infant. She also underwent a cholecystectomy. She has never smoked. REVIEW OF SYSTEMS:  She denies any difficulty swallowing. She denies chest pain as noted above. No history of peripheral edema. She denies any gastrointestinal symptoms and has no history of TIA or stroke. PHYSICAL EXAMINATION:  GENERAL:  She is alert and comfortable. VITAL SIGNS:  Her blood pressure is 130/90, her pulse is 90 and regular.   NECK:  She has no cervical bruits. CHEST:  Clear. HEART:  Her heart is in a regular rhythm. LABORATORY DATA:  Review of her labs is unremarkable. Her chest x-ray is unremarkable. I discussed the findings with her catheterization and reviewed the risks, indications, and alternatives of coronary artery bypass surgery. She will remain in the hospital until surgical revascularization can be done. She will be treated with antianginal medications and beta blockers.       MD BETO Wadsworth/[email or web address]  D:  03/26/2021 16:14  T:  03/26/2021 18:50  JOB #:  [id number]

## 2021-03-26 NOTE — H&P
CSS   History and Physical    Subjective:      Mikki Amaya is a 76 y.o.  female who was referred for cardiac evaluation of CAD by Dr. Loco Rodriguez. Pt has a significant PMH of HLD and benign murmur since a teenager. Pt has recently moved from [place] to [place] following the death of her . She began walking with her sister in law and noticed SOB with once they got up to walking 1 mile. She decided to be seen by PCP Sariah Price NP who referred her for a stress scho. Pt followed up with Dr. Loco Rodriguez after an abnormal stress echo on 3/17/21. She was started on Metoprolol and lipitor was increased to 40mg following her ECHO. She denies CP, but was positive for SOB and palpitations. She proceeded with cardiac catheterization with Dr. Loco Rodriguez on 3/25/21, was found to have multivessel disease and our team was consulted. She denies CP, SOB, dizziness, PND, orthopnea, edema, or claudication at this time. Pt retired. She is a recent . She moved in with her bother and sister in law here. She has lost 3 close family members in the last year, including her  and oldest daughter. She has never smoked, denies alcohol or illicit drug use. Cardiac Testing  Cardiac catheterization: 3/25/21  Conclusion   Cath:  Obstructive 2VD     LM 30     LAD m100; D1 90     OM2 50     RCA ost50, p95, C475  Mild LV systolic dysfunction     EF 45%     Inferobasal AK  No AVG/MR  Patent L SC.  RFA angioseal.     CTSx consult for CABG   Coronary Findings  Diagnostic  Dominance: Right  Left Main   Mid LM lesion, 30% stenosed. Left Anterior Descending   Mid LAD lesion, 100% stenosed. First Diagonal Branch   1st Diag lesion, 90% stenosed. Left Circumflex   The vessel was visualized by angiography. The vessel exhibits minimal luminal irregularities. First Obtuse Marginal Branch   The vessel was visualized by angiography. The vessel exhibits minimal luminal irregularities.    Second Obtuse Marginal Branch   2nd Mrg lesion, 50% stenosed. Right Coronary Artery   Ost RCA lesion, 50% stenosed. Prox RCA lesion, 95% stenosed. Mid RCA lesion, 100% stenosed. 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 Authorizing Provider   therapeutic multivitamin [facility]) tablet Take 1 Tab by mouth daily. Yes Provider, Historical   atorvastatin (LIPITOR) 20 mg tablet Take 40 mg by mouth daily. Provider, Historical   aspirin 81 mg chewable tablet Take 81 mg by mouth daily. Provider, Historical   metoprolol tartrate (LOPRESSOR) 25 mg tablet Take 25 mg by mouth two (2) times a day. Provider, Historical       No Known Allergies    Review of Systems:   Consititutional: Denies fever or chills. Eyes:  Denies use of glasses or vision problems(cataracts). ENT:  Denies hearing or swallowing difficulty. CV: Denies CP, claudication, HTN. Resp: Denies dyspnea, productive cough. : Denies dialysis or kidney problems. GI: Denies ulcers, esophageal strictures, liver problems. M/S: Denies joint or bone problems, or implanted artificial hardware. Skin: Denies varicose veins, edema. Neuro: Denies strokes, or TIAs. Psych: Denies anxiety or depression. Endocrine: Denies thyroid problems or diabetes. Heme/Lymphatic: Denies easy bruising or lymphedema.      Objective:     VS:   Visit Vitals  /80 (BP 1 Location: Left upper arm, BP Patient Position: Sitting)   Pulse 77   Temp 98.2 °F (36.8 °C)   Resp 16   Wt 162 lb 7.7 oz (73.7 kg)   SpO2 98%   BMI 27.89 kg/m²       Physical Exam:    General appearance: alert, cooperative, no distress  Head: normocephalic, without obvious abnormality; atraumatic  Eyes: conjunctivae/corneas clear; EOM's intact. +glasses  Nose: nares normal; no drainage. Neck: no carotid bruit and no JVD  Lungs: clear to auscultation bilaterally  Heart: regular rate and rhythm; no murmur  Abdomen: soft, non-tender; bowel sounds normal  Extremities: moves all extremities; no weakness. Skin: Skin color normal; No varicose veins or edema. Neurologic: Grossly normal      Labs:   Recent Labs     03/26/21  0512   WBC 5.8   HGB 13.1   HCT 40.1         K 3.8   BUN 17   CREA 0.68   GLU 94   INR 1.1       Diagnostics:   PA and lateral:   CXR Results  (Last 48 hours)               03/26/21 1340  XR CHEST PA LAT Final result    Impression:  No acute abnormality                       Narrative:  EXAM:  XR CHEST PA LAT       INDICATION:   preop heart       COMPARISON: 3/25/2021. FINDINGS: PA and lateral radiographs of the chest demonstrate clear lungs. The   cardiac and mediastinal contours and pulmonary vascularity are normal.  Bony   structures are unchanged. 03/25/21 1804  XR CHEST PA LAT Final result    Impression:  1. No acute cardiopulmonary disease           Narrative:  INDICATION:  preop heart        Exam: Chest 2 views. Comparison: None. Findings: Cardiomediastinal silhouette is normal. Pulmonary vasculature is not   engorged. No focal parenchymal opacities, effusions, or pneumothorax. Bony   thorax is intact. Carotid doppler: 3/25/21  Interpretation Summary  Findings are consistent with 0-49% stenosis of the right internal carotid and 0-49% stenosis of the left internal carotid. Vertebrals are patent with antegrade flow.    Cerebrovascular Findings  Right Carotid  The right CCA is patent. There is mild stenosis in the right ICA (<50%). The right ICA has moderate and calcific plaque. The right ECA is patent. The right vertebral is antegrade. The right subclavian is normal.   Left Carotid  The left CCA is patent. There is mild stenosis in the left ICA (<50%). The left ICA has mild and heterogeneous plaque. The left ECA is patent. The left vertebral is antegrade. The left subclavian is normal.     PFTS-FEV1: n/a    EKG:  pending  Assessment:     Active Problems:    CAD (coronary artery disease) (3/25/2021)      HLD (hyperlipidemia) (3/26/2021)        Plan:     STS Risk Calculator V2.81 - Discussed by surgeon with patient. Procedure: Isolated CAB  Risk of Mortality: 0.838%  Renal Failure: 0.429%  Permanent Stroke: 0.681%  Prolonged Ventilation: 3.037%  DSW Infection: 0.101%  Reoperation: 1.835%  Morbidity or Mortality: 5.219%  Short Length of Stay: 59.258%  Long Length of Stay: 2.044%    Treatment Plan:    1. CAD 2VD: ASA/Statin/BB. Preop amio ordered. Plan for CABG with Dr. Davidson Valadez next week. Preop workup ordered. Cont preop education. Consents need to be done. 2. Acute systolic dysfunction, NYHA class II: LVEF 45%. Cont Metoprolol. ACEI/ARB not appropriate preop. Will start GDTM postop as appropriate. 3. HLD: Cont lipitor     Dispo: PT/OT.  preop workup, plan for surgery next week       Signed By: Katty Hutton NP     March 26, 2021        Above reviewed and confirmed   Full History and physical by me dictated 3/26/2021

## 2021-03-26 NOTE — PROGRESS NOTES
Received report from James Bermudez that pt will come from cath to Bed 325. Has a rt groin site that is bandaged with  tegaderm and no complaint of pain.   Will assess once arrives

## 2021-03-26 NOTE — PROGRESS NOTES
Cardiac Surgery Care Coordinator-  Met with Les Torres, Introduced role of the Cardiac Surgery Care Coordinator. Reviewed plan of care and began pre-op education. Discussed day of surgery expectations for the pt and family. Instructed pt on the proper use of the incentive spirometer, she is able to pull 2000cc with good effort. Her sister in law has taken the educational folder home, provided her with the post op instruction booklet. Reinforced sternal precautions and keeping your move in the tube. Encouraged Les Torres  to verbalize and offered emotional support.  Tessa Florez RN

## 2021-03-27 LAB
ARTERIAL PATENCY WRIST A: NO
BASE EXCESS BLD CALC-SCNC: 2 MMOL/L
BDY SITE: ABNORMAL
CA-I BLD-SCNC: 1.24 MMOL/L (ref 1.12–1.32)
COVID-19 RAPID TEST, COVR: NOT DETECTED
GAS FLOW.O2 O2 DELIVERY SYS: ABNORMAL L/MIN
HCO3 BLD-SCNC: 27 MMOL/L (ref 22–26)
O2/TOTAL GAS SETTING VFR VENT: 21 %
PCO2 BLD: 42.6 MMHG (ref 35–45)
PH BLD: 7.41 [PH] (ref 7.35–7.45)
PO2 BLD: 72 MMHG (ref 80–100)
SAO2 % BLD: 94 % (ref 92–97)
SARS-COV-2, COV2: NORMAL
SOURCE, COVRS: NORMAL
SPECIMEN TYPE: ABNORMAL
TOTAL RESP. RATE, ITRR: 17

## 2021-03-27 PROCEDURE — 36600 WITHDRAWAL OF ARTERIAL BLOOD: CPT

## 2021-03-27 PROCEDURE — 74011250637 HC RX REV CODE- 250/637: Performed by: NURSE PRACTITIONER

## 2021-03-27 PROCEDURE — 82803 BLOOD GASES ANY COMBINATION: CPT

## 2021-03-27 PROCEDURE — 74011000250 HC RX REV CODE- 250: Performed by: NURSE PRACTITIONER

## 2021-03-27 PROCEDURE — 99232 SBSQ HOSP IP/OBS MODERATE 35: CPT | Performed by: THORACIC SURGERY (CARDIOTHORACIC VASCULAR SURGERY)

## 2021-03-27 PROCEDURE — 65660000001 HC RM ICU INTERMED STEPDOWN

## 2021-03-27 PROCEDURE — 87635 SARS-COV-2 COVID-19 AMP PRB: CPT

## 2021-03-27 PROCEDURE — 74011250637 HC RX REV CODE- 250/637: Performed by: PHYSICIAN ASSISTANT

## 2021-03-27 RX ORDER — SULFAMETHOXAZOLE AND TRIMETHOPRIM 800; 160 MG/1; MG/1
1 TABLET ORAL EVERY 12 HOURS
Status: DISCONTINUED | OUTPATIENT
Start: 2021-03-27 | End: 2021-03-30

## 2021-03-27 RX ADMIN — METOPROLOL TARTRATE 25 MG: 25 TABLET, FILM COATED ORAL at 17:32

## 2021-03-27 RX ADMIN — Medication 10 ML: at 21:50

## 2021-03-27 RX ADMIN — SULFAMETHOXAZOLE AND TRIMETHOPRIM 1 TABLET: 800; 160 TABLET ORAL at 17:32

## 2021-03-27 RX ADMIN — AMIODARONE HYDROCHLORIDE 400 MG: 200 TABLET ORAL at 09:17

## 2021-03-27 RX ADMIN — METOPROLOL TARTRATE 25 MG: 25 TABLET, FILM COATED ORAL at 09:17

## 2021-03-27 RX ADMIN — ASPIRIN 81 MG: 81 TABLET, CHEWABLE ORAL at 09:17

## 2021-03-27 RX ADMIN — CHLORHEXIDINE GLUCONATE 15 ML: 1.2 RINSE ORAL at 23:47

## 2021-03-27 RX ADMIN — AMIODARONE HYDROCHLORIDE 400 MG: 200 TABLET ORAL at 21:49

## 2021-03-27 RX ADMIN — CHLORHEXIDINE GLUCONATE 15 ML: 1.2 RINSE ORAL at 00:13

## 2021-03-27 RX ADMIN — THERA TABS 1 TABLET: TAB at 09:17

## 2021-03-27 RX ADMIN — CHLORHEXIDINE GLUCONATE 15 ML: 1.2 RINSE ORAL at 13:11

## 2021-03-27 RX ADMIN — ATORVASTATIN CALCIUM 40 MG: 40 TABLET, FILM COATED ORAL at 09:17

## 2021-03-27 NOTE — PROGRESS NOTES
Problem: Falls - Risk of  Goal: *Absence of Falls  Description: Document Anitra Cason Fall Risk and appropriate interventions in the flowsheet.   Outcome: Progressing Towards Goal  Note: Fall Risk Interventions:   Gripper Socks  Call light in reach

## 2021-03-27 NOTE — PROGRESS NOTES
1930[de-identified] Bedside and Verbal shift change report given to Ava (oncoming nurse) by Anat (offgoing nurse). Report included the following information SBAR, Kardex, ED Summary, Procedure Summary, Intake/Output, MAR, Accordion and Recent Results. 0730: Bedside and Verbal shift change report given to Aman Oakes (oncoming nurse) by Ava (offgoing nurse). Report included the following information SBAR, Kardex, Procedure Summary, Intake/Output, MAR, Accordion, Recent Results and Cardiac Rhythm NSR;Sinus Lis Mcintoshr.

## 2021-03-27 NOTE — PROGRESS NOTES
Preceptor Review of RN Work    3/27/2021  - Shift times - 1930 to 0730    The RN documentation of patient care for Ifna [place] has been reviewed and approved. All medications have been administered under the direct supervision of this preceptor.     Priya Farris RN

## 2021-03-27 NOTE — PROGRESS NOTES
1930: Bedside and Verbal shift change report given to Ava (oncoming nurse) by Shawanda Padilla (offgoing nurse). Report included the following information SBAR, Kardex, Procedure Summary, Intake/Output, MAR, Accordion, Recent Results and Cardiac Rhythm NSR;Sinus Alejandrina Six. 0730: Bedside and Verbal shift change report given to Shawanda Padilla (oncoming nurse) by Ava (offgoing nurse). Report included the following information SBAR, Kardex, Procedure Summary, Intake/Output, MAR, Accordion, Recent Results and Cardiac Rhythm NSR;Sinus Alejandrina Six.

## 2021-03-27 NOTE — PROGRESS NOTES
[facility]  Progress Note    Admit Date: 3/26/2021  POD: * No surgery found *      Procedure:      Subjective/24 Hour Summary:   Pt seen with Dr. Ileana Puckett. No new issues. Resting comfortably. Awaiting surgery, likely Tuesday. Objective:     Visit Vitals  BP (!) 152/70 (BP 1 Location: Left upper arm, BP Patient Position: Sitting)   Pulse (!) 58   Temp 97.6 °F (36.4 °C)   Resp 17   Wt 161 lb 13.1 oz (73.4 kg)   SpO2 99%   BMI 27.78 kg/m²     Temp (24hrs), Av °F (36.7 °C), Min:97.6 °F (36.4 °C), Max:98.6 °F (37 °C)      Last 24hr Input/Output:    Intake/Output Summary (Last 24 hours) at 3/27/2021 1334  Last data filed at 3/27/2021 0454  Gross per 24 hour   Intake 410 ml   Output 800 ml   Net -390 ml        EKG/Rhythm: SR      Oxygen:RA    CXR:  CXR Results  (Last 48 hours)               21 1340  XR CHEST PA LAT Final result    Impression:  No acute abnormality                       Narrative:  EXAM:  XR CHEST PA LAT       INDICATION:   preop heart       COMPARISON: 3/25/2021. FINDINGS: PA and lateral radiographs of the chest demonstrate clear lungs. The   cardiac and mediastinal contours and pulmonary vascularity are normal.  Bony   structures are unchanged. 21 1804  XR CHEST PA LAT Final result    Impression:  1. No acute cardiopulmonary disease           Narrative:  INDICATION:  preop heart        Exam: Chest 2 views. Comparison: None. Findings: Cardiomediastinal silhouette is normal. Pulmonary vasculature is not   engorged. No focal parenchymal opacities, effusions, or pneumothorax. Bony   thorax is intact. Admission Weight: Last Weight   Weight: 162 lb 7.7 oz (73.7 kg) Weight: 161 lb 13.1 oz (73.4 kg)       EXAM:  General: NAD   Lungs:   Clear to auscultation bilaterally. Heart:  Regular rate and rhythm, S1, S2 normal, no murmur, click, rub or gallop. Abdomen:   Soft, non-tender.  Bowel sounds normal. No masses,  No organomegaly. Extremities:  No edema. PPP   Neurologic:  Gross motor and sensory apparatus intact. Activity: ad ruth ann    Diet: heart healthy    Lab Data Reviewed:   Recent Labs     21  0512   WBC 5.8   HGB 13.1   HCT 40.1         K 3.8   BUN 17   CREA 0.68   GLU 94   INR 1.1         Assessment:     Active Problems:    CAD (coronary artery disease) (3/25/2021)      HLD (hyperlipidemia) (3/26/2021)             Plan/Recommendations/Medical Decision Makin. CAD 2VD: ASA/Statin/BB. Preop amio ordered. Plan for CABG with Dr. Eve Welch next week. Preop workup ordered. Cont preop education. Consents need to be done. 2. Acute systolic dysfunction, NYHA class II: LVEF 45%. Cont Metoprolol. ACEI/ARB not appropriate preop. Will start GDTM postop as appropriate. 3. HLD: Cont lipitor   4. UTI: start bactrim    Dispo: preop workup in progress. Start bactrim for UTI.       Signed By: ROSA Villa    No complaints overnight   Denies chest pain or SOB   Awaiting cabg Monday

## 2021-03-28 ENCOUNTER — APPOINTMENT (OUTPATIENT)
Dept: VASCULAR SURGERY | Age: 66
DRG: 236 | End: 2021-03-28
Attending: PHYSICIAN ASSISTANT
Payer: MEDICARE

## 2021-03-28 LAB
GLUCOSE BLD STRIP.AUTO-MCNC: 117 MG/DL (ref 65–100)
SERVICE CMNT-IMP: ABNORMAL
T4 FREE SERPL-MCNC: 1 NG/DL (ref 0.8–1.5)

## 2021-03-28 PROCEDURE — 65660000001 HC RM ICU INTERMED STEPDOWN

## 2021-03-28 PROCEDURE — 74011250637 HC RX REV CODE- 250/637: Performed by: PHYSICIAN ASSISTANT

## 2021-03-28 PROCEDURE — 84439 ASSAY OF FREE THYROXINE: CPT

## 2021-03-28 PROCEDURE — 74011250637 HC RX REV CODE- 250/637: Performed by: NURSE PRACTITIONER

## 2021-03-28 PROCEDURE — 93922 UPR/L XTREMITY ART 2 LEVELS: CPT

## 2021-03-28 PROCEDURE — 77030027138 HC INCENT SPIROMETER -A

## 2021-03-28 PROCEDURE — 82962 GLUCOSE BLOOD TEST: CPT

## 2021-03-28 PROCEDURE — 94375 RESPIRATORY FLOW VOLUME LOOP: CPT

## 2021-03-28 PROCEDURE — 36415 COLL VENOUS BLD VENIPUNCTURE: CPT

## 2021-03-28 PROCEDURE — 83520 IMMUNOASSAY QUANT NOS NONAB: CPT

## 2021-03-28 RX ADMIN — ASPIRIN 81 MG: 81 TABLET, CHEWABLE ORAL at 08:37

## 2021-03-28 RX ADMIN — METOPROLOL TARTRATE 25 MG: 25 TABLET, FILM COATED ORAL at 08:37

## 2021-03-28 RX ADMIN — ATORVASTATIN CALCIUM 40 MG: 40 TABLET, FILM COATED ORAL at 08:37

## 2021-03-28 RX ADMIN — SULFAMETHOXAZOLE AND TRIMETHOPRIM 1 TABLET: 800; 160 TABLET ORAL at 17:24

## 2021-03-28 RX ADMIN — Medication 10 ML: at 23:48

## 2021-03-28 RX ADMIN — Medication 10 ML: at 06:24

## 2021-03-28 RX ADMIN — SULFAMETHOXAZOLE AND TRIMETHOPRIM 1 TABLET: 800; 160 TABLET ORAL at 06:24

## 2021-03-28 RX ADMIN — MUPIROCIN: 20 OINTMENT TOPICAL at 17:26

## 2021-03-28 RX ADMIN — METOPROLOL TARTRATE 25 MG: 25 TABLET, FILM COATED ORAL at 17:24

## 2021-03-28 RX ADMIN — CHLORHEXIDINE GLUCONATE 15 ML: 1.2 RINSE ORAL at 12:57

## 2021-03-28 RX ADMIN — THERA TABS 1 TABLET: TAB at 08:37

## 2021-03-28 RX ADMIN — AMIODARONE HYDROCHLORIDE 400 MG: 200 TABLET ORAL at 08:37

## 2021-03-28 RX ADMIN — MUPIROCIN: 20 OINTMENT TOPICAL at 08:41

## 2021-03-28 RX ADMIN — CHLORHEXIDINE GLUCONATE 15 ML: 1.2 RINSE ORAL at 23:49

## 2021-03-28 RX ADMIN — AMIODARONE HYDROCHLORIDE 400 MG: 200 TABLET ORAL at 20:37

## 2021-03-28 NOTE — PROGRESS NOTES
Problem: Falls - Risk of  Goal: *Absence of Falls  Description: Document Georgiarossana Tc Fall Risk and appropriate interventions in the flowsheet.   Outcome: Progressing Towards Goal  Note: Fall Risk Interventions:            Medication Interventions: Assess postural VS orthostatic hypotension, Teach patient to arise slowly

## 2021-03-28 NOTE — PROGRESS NOTES
[facility]  Progress Note    Admit Date: 3/26/2021  POD: * No surgery found *      Procedure:      Subjective/24 Hour Summary:   Pt seen with Dr. Caryl Triplett. No new issues. Resting comfortably. Awaiting surgery, . Objective:     Visit Vitals  /66   Pulse (!) 51   Temp 97.7 °F (36.5 °C)   Resp 16   Wt 162 lb 11.2 oz (73.8 kg)   SpO2 99%   BMI 27.93 kg/m²     Temp (24hrs), Av °F (36.7 °C), Min:97.7 °F (36.5 °C), Max:98.3 °F (36.8 °C)      Last 24hr Input/Output:    Intake/Output Summary (Last 24 hours) at 3/28/2021 1303  Last data filed at 3/28/2021 0334  Gross per 24 hour   Intake 140 ml   Output 300 ml   Net -160 ml        EKG/Rhythm: SR      Oxygen:RA    CXR:  CXR Results  (Last 48 hours)               21 1340  XR CHEST PA LAT Final result    Impression:  No acute abnormality                       Narrative:  EXAM:  XR CHEST PA LAT       INDICATION:   preop heart       COMPARISON: 3/25/2021. FINDINGS: PA and lateral radiographs of the chest demonstrate clear lungs. The   cardiac and mediastinal contours and pulmonary vascularity are normal.  Bony   structures are unchanged. Admission Weight: Last Weight   Weight: 162 lb 7.7 oz (73.7 kg) Weight: 162 lb 11.2 oz (73.8 kg)       EXAM:  General: NAD   Lungs:   Clear to auscultation bilaterally. Heart:  Regular rate and rhythm, S1, S2 normal, no murmur, click, rub or gallop. Abdomen:   Soft, non-tender. Bowel sounds normal. No masses,  No organomegaly. Extremities:  No edema. PPP   Neurologic:  Gross motor and sensory apparatus intact.      Activity: ad ruth ann    Diet: heart healthy    Lab Data Reviewed:   Recent Labs     21  1112 21  0512   WBC  --  5.8   HGB  --  13.1   HCT  --  40.1   PLT  --  174   NA  --  141   K  --  3.8   BUN  --  17   CREA  --  0.68   GLU  --  94   GLUCPOC 117*  --    INR  --  1.1         Assessment:     Active Problems:    CAD (coronary artery disease) (3/25/2021)      HLD (hyperlipidemia) (3/26/2021)             Plan/Recommendations/Medical Decision Makin. CAD 2VD: ASA/Statin/BB. Preop amio ordered. Plan for CABG with Dr. Geoff Valenzuela on tuesday. Preop workup done. Cont preop education. Consents need to be done tomorrow. 2. Acute systolic dysfunction, NYHA class II: LVEF 45%. Cont Metoprolol. ACEI/ARB not appropriate preop. Will start GDMT postop as able. 3. HLD: Cont lipitor   4. UTI: cont bactrim    Dispo: preop workup in progress. Surgery Tuesday.       Signed By: ROSA Marin

## 2021-03-28 NOTE — PROGRESS NOTES
0730  Preceptor Review of RN Work    3/28/2021  - Shift times - 1930  to 0730    The RN documentation of patient care for Harper Santillan has been reviewed and approved. All medications have been administered under the direct supervision of this preceptor.     Ethan Blackwell RN

## 2021-03-29 ENCOUNTER — HOME HEALTH ADMISSION (OUTPATIENT)
Dept: HOME HEALTH SERVICES | Facility: HOME HEALTH | Age: 66
End: 2021-03-29
Payer: MEDICARE

## 2021-03-29 ENCOUNTER — ANESTHESIA EVENT (OUTPATIENT)
Dept: CARDIOTHORACIC SURGERY | Age: 66
DRG: 236 | End: 2021-03-29
Payer: MEDICARE

## 2021-03-29 LAB
ANION GAP SERPL CALC-SCNC: 5 MMOL/L (ref 5–15)
BUN SERPL-MCNC: 17 MG/DL (ref 6–20)
BUN/CREAT SERPL: 18 (ref 12–20)
CALCIUM SERPL-MCNC: 8.7 MG/DL (ref 8.5–10.1)
CHLORIDE SERPL-SCNC: 109 MMOL/L (ref 97–108)
CO2 SERPL-SCNC: 25 MMOL/L (ref 21–32)
CREAT SERPL-MCNC: 0.93 MG/DL (ref 0.55–1.02)
ERYTHROCYTE [DISTWIDTH] IN BLOOD BY AUTOMATED COUNT: 13.2 % (ref 11.5–14.5)
GLUCOSE SERPL-MCNC: 94 MG/DL (ref 65–100)
HCT VFR BLD AUTO: 42.5 % (ref 35–47)
HGB BLD-MCNC: 13.8 G/DL (ref 11.5–16)
HISTORY CHECKED?,CKHIST: NORMAL
LEFT ABI: 1.03
LEFT ANTERIOR TIBIAL: 142 MMHG
LEFT ARM BP: 134 MMHG
LEFT POSTERIOR TIBIAL: 145 MMHG
MAGNESIUM SERPL-MCNC: 2 MG/DL (ref 1.6–2.4)
MCH RBC QN AUTO: 28.5 PG (ref 26–34)
MCHC RBC AUTO-ENTMCNC: 32.5 G/DL (ref 30–36.5)
MCV RBC AUTO: 87.8 FL (ref 80–99)
NRBC # BLD: 0 K/UL (ref 0–0.01)
NRBC BLD-RTO: 0 PER 100 WBC
PLATELET # BLD AUTO: 193 K/UL (ref 150–400)
PMV BLD AUTO: 9.5 FL (ref 8.9–12.9)
POTASSIUM SERPL-SCNC: 3.8 MMOL/L (ref 3.5–5.1)
RBC # BLD AUTO: 4.84 M/UL (ref 3.8–5.2)
RIGHT ABI: 0.99
RIGHT ANTERIOR TIBIAL: 140 MMHG
RIGHT ARM BP: 141 MMHG
RIGHT POSTERIOR TIBIAL: 132 MMHG
SODIUM SERPL-SCNC: 139 MMOL/L (ref 136–145)
TSH RECEP AB SER-ACNC: <1.1 IU/L (ref 0–1.75)
WBC # BLD AUTO: 7.2 K/UL (ref 3.6–11)

## 2021-03-29 PROCEDURE — 94010 BREATHING CAPACITY TEST: CPT

## 2021-03-29 PROCEDURE — 74011250637 HC RX REV CODE- 250/637: Performed by: NURSE PRACTITIONER

## 2021-03-29 PROCEDURE — 80048 BASIC METABOLIC PNL TOTAL CA: CPT

## 2021-03-29 PROCEDURE — 36415 COLL VENOUS BLD VENIPUNCTURE: CPT

## 2021-03-29 PROCEDURE — 83735 ASSAY OF MAGNESIUM: CPT

## 2021-03-29 PROCEDURE — 86901 BLOOD TYPING SEROLOGIC RH(D): CPT

## 2021-03-29 PROCEDURE — 74011250637 HC RX REV CODE- 250/637: Performed by: PHYSICIAN ASSISTANT

## 2021-03-29 PROCEDURE — 86923 COMPATIBILITY TEST ELECTRIC: CPT

## 2021-03-29 PROCEDURE — 65660000001 HC RM ICU INTERMED STEPDOWN

## 2021-03-29 PROCEDURE — 85027 COMPLETE CBC AUTOMATED: CPT

## 2021-03-29 RX ORDER — SODIUM CHLORIDE 0.9 % (FLUSH) 0.9 %
5-40 SYRINGE (ML) INJECTION EVERY 8 HOURS
Status: CANCELLED | OUTPATIENT
Start: 2021-03-29

## 2021-03-29 RX ORDER — DOBUTAMINE HYDROCHLORIDE 200 MG/100ML
0-10 INJECTION INTRAVENOUS
Status: DISCONTINUED | OUTPATIENT
Start: 2021-03-30 | End: 2021-03-31

## 2021-03-29 RX ORDER — HYDROMORPHONE HYDROCHLORIDE 1 MG/ML
0.2 INJECTION, SOLUTION INTRAMUSCULAR; INTRAVENOUS; SUBCUTANEOUS
Status: CANCELLED | OUTPATIENT
Start: 2021-03-29

## 2021-03-29 RX ORDER — POTASSIUM CHLORIDE 29.8 MG/ML
20 INJECTION INTRAVENOUS ONCE
Status: DISCONTINUED | OUTPATIENT
Start: 2021-03-30 | End: 2021-03-30 | Stop reason: HOSPADM

## 2021-03-29 RX ORDER — DEXMEDETOMIDINE HYDROCHLORIDE 4 UG/ML
.1-1.5 INJECTION, SOLUTION INTRAVENOUS
Status: DISCONTINUED | OUTPATIENT
Start: 2021-03-30 | End: 2021-03-31

## 2021-03-29 RX ORDER — FENTANYL CITRATE 50 UG/ML
25 INJECTION, SOLUTION INTRAMUSCULAR; INTRAVENOUS
Status: CANCELLED | OUTPATIENT
Start: 2021-03-29

## 2021-03-29 RX ORDER — ALBUMIN HUMAN 50 G/1000ML
25 SOLUTION INTRAVENOUS ONCE
Status: DISCONTINUED | OUTPATIENT
Start: 2021-03-30 | End: 2021-03-30 | Stop reason: HOSPADM

## 2021-03-29 RX ORDER — NITROGLYCERIN 20 MG/100ML
16.5 INJECTION INTRAVENOUS CONTINUOUS
Status: DISCONTINUED | OUTPATIENT
Start: 2021-03-30 | End: 2021-03-30

## 2021-03-29 RX ORDER — HEPARIN SOD,PORCINE/0.9 % NACL 30K/1000ML
50-1000 INTRAVENOUS SOLUTION INTRAVENOUS AS NEEDED
Status: DISCONTINUED | OUTPATIENT
Start: 2021-03-30 | End: 2021-03-30 | Stop reason: HOSPADM

## 2021-03-29 RX ORDER — ONDANSETRON 2 MG/ML
4 INJECTION INTRAMUSCULAR; INTRAVENOUS AS NEEDED
Status: CANCELLED | OUTPATIENT
Start: 2021-03-29

## 2021-03-29 RX ORDER — MAGNESIUM SULFATE HEPTAHYDRATE 40 MG/ML
2 INJECTION, SOLUTION INTRAVENOUS ONCE
Status: DISCONTINUED | OUTPATIENT
Start: 2021-03-30 | End: 2021-03-30 | Stop reason: HOSPADM

## 2021-03-29 RX ORDER — SODIUM CHLORIDE 0.9 % (FLUSH) 0.9 %
5-40 SYRINGE (ML) INJECTION AS NEEDED
Status: CANCELLED | OUTPATIENT
Start: 2021-03-29

## 2021-03-29 RX ORDER — PROTAMINE SULFATE 10 MG/ML
500 INJECTION, SOLUTION INTRAVENOUS ONCE
Status: DISCONTINUED | OUTPATIENT
Start: 2021-03-30 | End: 2021-03-30 | Stop reason: HOSPADM

## 2021-03-29 RX ADMIN — THERA TABS 1 TABLET: TAB at 09:31

## 2021-03-29 RX ADMIN — MUPIROCIN: 20 OINTMENT TOPICAL at 09:32

## 2021-03-29 RX ADMIN — SULFAMETHOXAZOLE AND TRIMETHOPRIM 1 TABLET: 800; 160 TABLET ORAL at 17:42

## 2021-03-29 RX ADMIN — METOPROLOL TARTRATE 25 MG: 25 TABLET, FILM COATED ORAL at 09:31

## 2021-03-29 RX ADMIN — ATORVASTATIN CALCIUM 40 MG: 40 TABLET, FILM COATED ORAL at 09:31

## 2021-03-29 RX ADMIN — Medication 10 ML: at 22:32

## 2021-03-29 RX ADMIN — MUPIROCIN: 20 OINTMENT TOPICAL at 17:42

## 2021-03-29 RX ADMIN — Medication 10 ML: at 14:41

## 2021-03-29 RX ADMIN — SULFAMETHOXAZOLE AND TRIMETHOPRIM 1 TABLET: 800; 160 TABLET ORAL at 05:59

## 2021-03-29 RX ADMIN — ASPIRIN 81 MG: 81 TABLET, CHEWABLE ORAL at 09:31

## 2021-03-29 RX ADMIN — CHLORHEXIDINE GLUCONATE 15 ML: 1.2 RINSE ORAL at 22:32

## 2021-03-29 RX ADMIN — CHLORHEXIDINE GLUCONATE 15 ML: 1.2 RINSE ORAL at 11:46

## 2021-03-29 RX ADMIN — METOPROLOL TARTRATE 25 MG: 25 TABLET, FILM COATED ORAL at 17:42

## 2021-03-29 RX ADMIN — Medication 10 ML: at 06:00

## 2021-03-29 NOTE — PROGRESS NOTES
Cardiac Surgery Care Coordinator- Met with Mikki [place], reinforced pre-op education and encouraged her to verbalize. She is without questions or concerns at this time. Will continue to follow.  Jesica Mcnamara RN

## 2021-03-29 NOTE — ANESTHESIA PREPROCEDURE EVALUATION
Relevant Problems   No relevant active problems       Anesthetic History   No history of anesthetic complications            Review of Systems / Medical History  Patient summary reviewed, nursing notes reviewed and pertinent labs reviewed    Pulmonary  Within defined limits                 Neuro/Psych   Within defined limits           Cardiovascular              CAD    Exercise tolerance: <4 METS  Comments: Cath:  Obstructive 2VD     LM 30     LAD m100; D1 90     OM2 50     RCA ost50, p95, C528  Mild LV systolic dysfunction     EF 45%     Inferobasal AK  No AVG/MR  Patent L SC   GI/Hepatic/Renal  Within defined limits              Endo/Other  Within defined limits           Other Findings              Physical Exam    Airway  Mallampati: II  TM Distance: 4 - 6 cm  Neck ROM: normal range of motion   Mouth opening: Normal     Cardiovascular    Rhythm: regular  Rate: normal         Dental  No notable dental hx       Pulmonary  Breath sounds clear to auscultation               Abdominal  Abdominal exam normal       Other Findings            Anesthetic Plan    ASA: 4  Anesthesia type: general    Monitoring Plan: Arterial line, BIS, CVP, Wells-Efe and YOJANA    Post procedure ventilation   Induction: Intravenous  Anesthetic plan and risks discussed with: Patient      Discussed with the patient risks of anesthesia for the procedure and they wish to proceed. Plan for GETA with standard ASA monitors, 2 PIV, arterial line, central line, PA catheter, YOJANA, blood transfusion likely, ICU post op and all other indicated procedures.

## 2021-03-29 NOTE — CARDIO/PULMONARY
Cardiac Rehab:  CABG  educational folder is at the bedside  of Ramírez Patten. Visited to verify the location for OP CR. Educated using teach back method. . Encouraged patient's consideration of participation in a Cardiac Rehab Program, after discharge, to assist with their risk modification and management. The OP CR at [facility] is closest to her home so the contact information is on her AVS. Ramírez Patten verbalized understanding with questions answered. Will continue to follow.  Dave Richardson RN

## 2021-03-29 NOTE — PROGRESS NOTES
0730: Bedside and Verbal shift change report given to [address] [place] [address] (oncoming nurse) by Anshul Wagner (offgoing nurse). Report included the following information SBAR, Kardex, Intake/Output, MAR, Accordion, Recent Results and Cardiac Rhythm sinus emily. 1930: Bedside and Verbal shift change report given to ODILIA Woods (oncoming nurse) by [address] [place] [address] (offgoing nurse). Report included the following information SBAR, Kardex, Intake/Output, MAR, Accordion, Recent Results and Cardiac Rhythm sinus emily.

## 2021-03-29 NOTE — PROGRESS NOTES
[facility]  Progress Note    Admit Date: 3/26/2021  POD: Preop Patient     Procedure:      Subjective/24 Hour Summary:   Pt seen with Dr. Cirilo Henderson. No new issues. Resting comfortably. Awaiting surgery, planned for 2nd case Tuesday. + BM 3/28    Objective:     Visit Vitals  BP (!) 119/54 (BP 1 Location: Left upper arm, BP Patient Position: At rest)   Pulse (!) 51   Temp 98.2 °F (36.8 °C)   Resp 17   Wt 161 lb 2.5 oz (73.1 kg)   SpO2 98%   BMI 27.66 kg/m²     Temp (24hrs), Av.9 °F (36.6 °C), Min:97.6 °F (36.4 °C), Max:98.2 °F (36.8 °C)      Last 24hr Input/Output:    Intake/Output Summary (Last 24 hours) at 3/29/2021 0929  Last data filed at 3/28/2021 2336  Gross per 24 hour   Intake    Output 300 ml   Net -300 ml        EKG/Rhythm: SB 50s    Oxygen:RA    CXR:  CXR Results  (Last 48 hours)    None          Admission Weight: Last Weight   Weight: 162 lb 7.7 oz (73.7 kg) Weight: 161 lb 2.5 oz (73.1 kg)       EXAM:  General: NAD, pleasant mood    Lungs:   Clear to auscultation bilaterally. Heart:  Slow rate and rhythm, S1, S2 normal, no murmur, click, rub or gallop. Abdomen:   Soft, non-tender. Bowel sounds normal. No masses,  No organomegaly. Extremities:  No edema. PPP   Neurologic:  Gross motor and sensory apparatus intact. Activity: ad ruth ann    Diet: heart healthy    Lab Data Reviewed:   Recent Labs     21  0555 21  1112   WBC 7.2  --    HGB 13.8  --    HCT 42.5  --      --      --    K 3.8  --    BUN 17  --    CREA 0.93  --    GLU 94  --    GLUCPOC  --  117*         Assessment:     Active Problems:    CAD (coronary artery disease) (3/25/2021)      HLD (hyperlipidemia) (3/26/2021)         Plan/Recommendations/Medical Decision Makin. CAD 2VD: ASA/Statin/BB. Preop amio stopped for bradycardia. Plan for 2nd case CABG with Dr. Cirilo Henderson on tuesday. Preop workup done. Cont preop education. 2. Acute systolic dysfunction, NYHA class II: LVEF 45%. Cont Metoprolol. ACEI/ARB not appropriate preop. Will start GDMT postop as able. 3. HLD: Cont lipitor     4. UTI: cont bactrim    Dispo: preop workup completed. Surgery Tuesday. Will complete consents today.        Signed By: Danie Shea NP

## 2021-03-30 ENCOUNTER — ANESTHESIA (OUTPATIENT)
Dept: CARDIOTHORACIC SURGERY | Age: 66
DRG: 236 | End: 2021-03-30
Payer: MEDICARE

## 2021-03-30 ENCOUNTER — HOSPITAL ENCOUNTER (OUTPATIENT)
Dept: NON INVASIVE DIAGNOSTICS | Age: 66
Discharge: HOME | End: 2021-03-30
Attending: THORACIC SURGERY (CARDIOTHORACIC VASCULAR SURGERY)

## 2021-03-30 ENCOUNTER — APPOINTMENT (OUTPATIENT)
Dept: GENERAL RADIOLOGY | Age: 66
DRG: 236 | End: 2021-03-30
Attending: NURSE PRACTITIONER
Payer: MEDICARE

## 2021-03-30 PROBLEM — Z95.1 S/P CABG X 3: Status: ACTIVE | Noted: 2021-03-30

## 2021-03-30 LAB
ABO + RH BLD: NORMAL
ADMINISTERED INITIALS, ADMINIT: NORMAL
ALBUMIN SERPL-MCNC: 3.9 G/DL (ref 3.5–5)
ALBUMIN SERPL-MCNC: 4.8 G/DL (ref 3.5–5)
ALBUMIN/GLOB SERPL: 1.9 {RATIO} (ref 1.1–2.2)
ALBUMIN/GLOB SERPL: 2.4 {RATIO} (ref 1.1–2.2)
ALP SERPL-CCNC: 59 U/L (ref 45–117)
ALP SERPL-CCNC: 62 U/L (ref 45–117)
ALT SERPL-CCNC: 74 U/L (ref 12–78)
ALT SERPL-CCNC: 78 U/L (ref 12–78)
ANION GAP SERPL CALC-SCNC: 6 MMOL/L (ref 5–15)
ANION GAP SERPL CALC-SCNC: 8 MMOL/L (ref 5–15)
APTT PPP: 30.2 SEC (ref 22.1–31)
ARTERIAL PATENCY WRIST A: ABNORMAL
ARTERIAL PATENCY WRIST A: YES
ARTERIAL PATENCY WRIST A: YES
AST SERPL-CCNC: 100 U/L (ref 15–37)
AST SERPL-CCNC: 136 U/L (ref 15–37)
BASE DEFICIT BLD-SCNC: 4 MMOL/L
BASE DEFICIT BLD-SCNC: 5 MMOL/L
BASE DEFICIT BLD-SCNC: 5 MMOL/L
BASOPHILS # BLD: 0 K/UL (ref 0–0.1)
BASOPHILS NFR BLD: 0 % (ref 0–1)
BDY SITE: ABNORMAL
BILIRUB SERPL-MCNC: 0.9 MG/DL (ref 0.2–1)
BILIRUB SERPL-MCNC: 1 MG/DL (ref 0.2–1)
BLD PROD TYP BPU: NORMAL
BLD PROD TYP BPU: NORMAL
BLOOD GROUP ANTIBODIES SERPL: NORMAL
BPU ID: NORMAL
BPU ID: NORMAL
BUN SERPL-MCNC: 15 MG/DL (ref 6–20)
BUN SERPL-MCNC: 16 MG/DL (ref 6–20)
BUN/CREAT SERPL: 17 (ref 12–20)
BUN/CREAT SERPL: 17 (ref 12–20)
CA-I BLD-SCNC: 1.17 MMOL/L (ref 1.12–1.32)
CA-I BLD-SCNC: 1.17 MMOL/L (ref 1.12–1.32)
CA-I BLD-SCNC: 1.23 MMOL/L (ref 1.12–1.32)
CALCIUM SERPL-MCNC: 7.8 MG/DL (ref 8.5–10.1)
CALCIUM SERPL-MCNC: 8.2 MG/DL (ref 8.5–10.1)
CHLORIDE SERPL-SCNC: 108 MMOL/L (ref 97–108)
CHLORIDE SERPL-SCNC: 113 MMOL/L (ref 97–108)
CO2 SERPL-SCNC: 20 MMOL/L (ref 21–32)
CO2 SERPL-SCNC: 23 MMOL/L (ref 21–32)
CREAT SERPL-MCNC: 0.87 MG/DL (ref 0.55–1.02)
CREAT SERPL-MCNC: 0.96 MG/DL (ref 0.55–1.02)
CROSSMATCH RESULT,%XM: NORMAL
CROSSMATCH RESULT,%XM: NORMAL
D50 ADMINISTERED, D50ADM: 0 ML
D50 ORDER, D50ORD: 0 ML
DIFFERENTIAL METHOD BLD: ABNORMAL
EOSINOPHIL # BLD: 0 K/UL (ref 0–0.4)
EOSINOPHIL NFR BLD: 0 % (ref 0–7)
ERYTHROCYTE [DISTWIDTH] IN BLOOD BY AUTOMATED COUNT: 13.2 % (ref 11.5–14.5)
GAS FLOW.O2 O2 DELIVERY SYS: ABNORMAL L/MIN
GAS FLOW.O2 SETTING OXYMISER: 16 BPM
GAS FLOW.O2 SETTING OXYMISER: 16 BPM
GLOBULIN SER CALC-MCNC: 2 G/DL (ref 2–4)
GLOBULIN SER CALC-MCNC: 2.1 G/DL (ref 2–4)
GLSCOM COMMENTS: NORMAL
GLUCOSE BLD STRIP.AUTO-MCNC: 113 MG/DL (ref 65–100)
GLUCOSE BLD STRIP.AUTO-MCNC: 118 MG/DL (ref 65–100)
GLUCOSE BLD STRIP.AUTO-MCNC: 125 MG/DL (ref 65–100)
GLUCOSE BLD STRIP.AUTO-MCNC: 129 MG/DL (ref 65–100)
GLUCOSE BLD STRIP.AUTO-MCNC: 92 MG/DL (ref 65–100)
GLUCOSE BLD STRIP.AUTO-MCNC: 96 MG/DL (ref 65–100)
GLUCOSE BLD STRIP.AUTO-MCNC: 98 MG/DL (ref 65–100)
GLUCOSE SERPL-MCNC: 120 MG/DL (ref 65–100)
GLUCOSE SERPL-MCNC: 93 MG/DL (ref 65–100)
GLUCOSE, GLC: 113 MG/DL
GLUCOSE, GLC: 118 MG/DL
GLUCOSE, GLC: 129 MG/DL
GLUCOSE, GLC: 95 MG/DL
GLUCOSE, GLC: 96 MG/DL
GLUCOSE, GLC: 98 MG/DL
HCO3 BLD-SCNC: 19.7 MMOL/L (ref 22–26)
HCO3 BLD-SCNC: 20.5 MMOL/L (ref 22–26)
HCO3 BLD-SCNC: 22.4 MMOL/L (ref 22–26)
HCT VFR BLD AUTO: 30.8 % (ref 35–47)
HCT VFR BLD AUTO: 33.2 % (ref 35–47)
HGB BLD-MCNC: 10.2 G/DL (ref 11.5–16)
HGB BLD-MCNC: 11.3 G/DL (ref 11.5–16)
HIGH TARGET, HITG: 130 MG/DL
IMM GRANULOCYTES # BLD AUTO: 0.2 K/UL (ref 0–0.04)
IMM GRANULOCYTES NFR BLD AUTO: 1 % (ref 0–0.5)
INR PPP: 1.3 (ref 0.9–1.1)
INSULIN ADMINSTERED, INSADM: 1.1 UNITS/HOUR
INSULIN ADMINSTERED, INSADM: 1.6 UNITS/HOUR
INSULIN ADMINSTERED, INSADM: 1.7 UNITS/HOUR
INSULIN ADMINSTERED, INSADM: 2.1 UNITS/HOUR
INSULIN ORDER, INSORD: 1.1 UNITS/HOUR
INSULIN ORDER, INSORD: 1.6 UNITS/HOUR
INSULIN ORDER, INSORD: 1.7 UNITS/HOUR
INSULIN ORDER, INSORD: 2.1 UNITS/HOUR
LOW TARGET, LOT: 95 MG/DL
LYMPHOCYTES # BLD: 1 K/UL (ref 0.8–3.5)
LYMPHOCYTES NFR BLD: 5 % (ref 12–49)
MAGNESIUM SERPL-MCNC: 2.4 MG/DL (ref 1.6–2.4)
MAGNESIUM SERPL-MCNC: 2.5 MG/DL (ref 1.6–2.4)
MCH RBC QN AUTO: 29.3 PG (ref 26–34)
MCHC RBC AUTO-ENTMCNC: 34 G/DL (ref 30–36.5)
MCV RBC AUTO: 86 FL (ref 80–99)
MINUTES UNTIL NEXT BG, NBG: 120 MIN
MINUTES UNTIL NEXT BG, NBG: 60 MIN
MONOCYTES # BLD: 0.8 K/UL (ref 0–1)
MONOCYTES NFR BLD: 4 % (ref 5–13)
MULTIPLIER, MUL: 0.03
NEUTS SEG # BLD: 17 K/UL (ref 1.8–8)
NEUTS SEG NFR BLD: 90 % (ref 32–75)
NRBC # BLD: 0 K/UL (ref 0–0.01)
NRBC BLD-RTO: 0 PER 100 WBC
O2/TOTAL GAS SETTING VFR VENT: 50 %
ORDER INITIALS, ORDINIT: NORMAL
PCO2 BLD: 33.7 MMHG (ref 35–45)
PCO2 BLD: 38.1 MMHG (ref 35–45)
PCO2 BLD: 43.7 MMHG (ref 35–45)
PEEP RESPIRATORY: 5 CMH2O
PH BLD: 7.32 [PH] (ref 7.35–7.45)
PH BLD: 7.34 [PH] (ref 7.35–7.45)
PH BLD: 7.38 [PH] (ref 7.35–7.45)
PLATELET # BLD AUTO: 165 K/UL (ref 150–400)
PMV BLD AUTO: 9.6 FL (ref 8.9–12.9)
PO2 BLD: 103 MMHG (ref 80–100)
PO2 BLD: 120 MMHG (ref 80–100)
PO2 BLD: 36 MMHG (ref 80–100)
POTASSIUM SERPL-SCNC: 4.4 MMOL/L (ref 3.5–5.1)
POTASSIUM SERPL-SCNC: 4.6 MMOL/L (ref 3.5–5.1)
PRESSURE SUPPORT SETTING VENT: 5 CMH2O
PROT SERPL-MCNC: 6 G/DL (ref 6.4–8.2)
PROT SERPL-MCNC: 6.8 G/DL (ref 6.4–8.2)
PROTHROMBIN TIME: 13.2 SEC (ref 9–11.1)
RBC # BLD AUTO: 3.86 M/UL (ref 3.8–5.2)
RBC MORPH BLD: ABNORMAL
SAO2 % BLD: 65 % (ref 92–97)
SAO2 % BLD: 98 % (ref 92–97)
SAO2 % BLD: 98 % (ref 92–97)
SERVICE CMNT-IMP: ABNORMAL
SERVICE CMNT-IMP: NORMAL
SODIUM SERPL-SCNC: 136 MMOL/L (ref 136–145)
SODIUM SERPL-SCNC: 142 MMOL/L (ref 136–145)
SPECIMEN EXP DATE BLD: NORMAL
SPECIMEN TYPE: ABNORMAL
STATUS OF UNIT,%ST: NORMAL
STATUS OF UNIT,%ST: NORMAL
THERAPEUTIC RANGE,PTTT: NORMAL SECS (ref 58–77)
TOTAL RESP. RATE, ITRR: 12
UNIT DIVISION, %UDIV: 0
UNIT DIVISION, %UDIV: 0
VENTILATION MODE VENT: ABNORMAL
VT SETTING VENT: 470 ML
VT SETTING VENT: 470 ML
WBC # BLD AUTO: 19 K/UL (ref 3.6–11)

## 2021-03-30 PROCEDURE — 77030020061 HC IV BLD WRMR ADMIN SET 3M -B: Performed by: ANESTHESIOLOGY

## 2021-03-30 PROCEDURE — 2709999900 HC NON-CHARGEABLE SUPPLY: Performed by: THORACIC SURGERY (CARDIOTHORACIC VASCULAR SURGERY)

## 2021-03-30 PROCEDURE — 77030003029 HC SUT VCRL J&J -B: Performed by: THORACIC SURGERY (CARDIOTHORACIC VASCULAR SURGERY)

## 2021-03-30 PROCEDURE — 77030018835 HC SOL IRR LR ICUM -A: Performed by: THORACIC SURGERY (CARDIOTHORACIC VASCULAR SURGERY)

## 2021-03-30 PROCEDURE — 77030041244 HC CBL PACE EXT TEMP REMG -B: Performed by: THORACIC SURGERY (CARDIOTHORACIC VASCULAR SURGERY)

## 2021-03-30 PROCEDURE — 77030012390 HC DRN CHST BTL GTNG -B: Performed by: THORACIC SURGERY (CARDIOTHORACIC VASCULAR SURGERY)

## 2021-03-30 PROCEDURE — 94002 VENT MGMT INPAT INIT DAY: CPT

## 2021-03-30 PROCEDURE — 74011000250 HC RX REV CODE- 250: Performed by: NURSE ANESTHETIST, CERTIFIED REGISTERED

## 2021-03-30 PROCEDURE — 65610000003 HC RM ICU SURGICAL

## 2021-03-30 PROCEDURE — P9045 ALBUMIN (HUMAN), 5%, 250 ML: HCPCS | Performed by: NURSE PRACTITIONER

## 2021-03-30 PROCEDURE — 77030014491 HC PLEDG PTFE BARD -A: Performed by: THORACIC SURGERY (CARDIOTHORACIC VASCULAR SURGERY)

## 2021-03-30 PROCEDURE — 77030037878 HC DRSG MEPILEX >48IN BORD MOLN -B

## 2021-03-30 PROCEDURE — 85730 THROMBOPLASTIN TIME PARTIAL: CPT

## 2021-03-30 PROCEDURE — 74011250636 HC RX REV CODE- 250/636: Performed by: NURSE ANESTHETIST, CERTIFIED REGISTERED

## 2021-03-30 PROCEDURE — 77030010389 HC WRE ATR PACE AEMC -B: Performed by: THORACIC SURGERY (CARDIOTHORACIC VASCULAR SURGERY)

## 2021-03-30 PROCEDURE — 77030013861 HC PNCH AORT CLNCUT QUES -B: Performed by: THORACIC SURGERY (CARDIOTHORACIC VASCULAR SURGERY)

## 2021-03-30 PROCEDURE — 77030013798 HC KT TRNSDUC PRSSR EDWD -B: Performed by: THORACIC SURGERY (CARDIOTHORACIC VASCULAR SURGERY)

## 2021-03-30 PROCEDURE — 74011000250 HC RX REV CODE- 250: Performed by: NURSE PRACTITIONER

## 2021-03-30 PROCEDURE — 77030037878 HC DRSG MEPILEX >48IN BORD MOLN -B: Performed by: THORACIC SURGERY (CARDIOTHORACIC VASCULAR SURGERY)

## 2021-03-30 PROCEDURE — C1713 ANCHOR/SCREW BN/BN,TIS/BN: HCPCS | Performed by: THORACIC SURGERY (CARDIOTHORACIC VASCULAR SURGERY)

## 2021-03-30 PROCEDURE — 77030019908 HC STETH ESOPH SIMS -A: Performed by: ANESTHESIOLOGY

## 2021-03-30 PROCEDURE — 77030041076 HC DRSG AG OPTICELL MDII -A: Performed by: THORACIC SURGERY (CARDIOTHORACIC VASCULAR SURGERY)

## 2021-03-30 PROCEDURE — 77030002524 HC INSTR CLMP FGRTY EDWD -B: Performed by: THORACIC SURGERY (CARDIOTHORACIC VASCULAR SURGERY)

## 2021-03-30 PROCEDURE — 85610 PROTHROMBIN TIME: CPT

## 2021-03-30 PROCEDURE — 83735 ASSAY OF MAGNESIUM: CPT

## 2021-03-30 PROCEDURE — 77030006994: Performed by: THORACIC SURGERY (CARDIOTHORACIC VASCULAR SURGERY)

## 2021-03-30 PROCEDURE — 77030003020 HC SUT TICRN COVD -A: Performed by: THORACIC SURGERY (CARDIOTHORACIC VASCULAR SURGERY)

## 2021-03-30 PROCEDURE — 74011000250 HC RX REV CODE- 250: Performed by: THORACIC SURGERY (CARDIOTHORACIC VASCULAR SURGERY)

## 2021-03-30 PROCEDURE — 77030031126 HC SUT CUST KT J&J -B: Performed by: THORACIC SURGERY (CARDIOTHORACIC VASCULAR SURGERY)

## 2021-03-30 PROCEDURE — 77030005402 HC CATH RAD ART LN KT TELE -B

## 2021-03-30 PROCEDURE — 71045 X-RAY EXAM CHEST 1 VIEW: CPT

## 2021-03-30 PROCEDURE — 77030006689 HC BLD OPHTH BVR BD -A: Performed by: THORACIC SURGERY (CARDIOTHORACIC VASCULAR SURGERY)

## 2021-03-30 PROCEDURE — B24BZZ4 ULTRASONOGRAPHY OF HEART WITH AORTA, TRANSESOPHAGEAL: ICD-10-PCS | Performed by: THORACIC SURGERY (CARDIOTHORACIC VASCULAR SURGERY)

## 2021-03-30 PROCEDURE — 77030005513 HC CATH URETH FOL11 MDII -B: Performed by: THORACIC SURGERY (CARDIOTHORACIC VASCULAR SURGERY)

## 2021-03-30 PROCEDURE — 36415 COLL VENOUS BLD VENIPUNCTURE: CPT

## 2021-03-30 PROCEDURE — 77030041524 HC SEALNT FIBRN VITASEAL J&J -C: Performed by: THORACIC SURGERY (CARDIOTHORACIC VASCULAR SURGERY)

## 2021-03-30 PROCEDURE — 77030002986 HC SUT PROL J&J -A: Performed by: THORACIC SURGERY (CARDIOTHORACIC VASCULAR SURGERY)

## 2021-03-30 PROCEDURE — 85025 COMPLETE CBC W/AUTO DIFF WBC: CPT

## 2021-03-30 PROCEDURE — 77030020263 HC SOL INJ SOD CL0.9% LFCR 1000ML: Performed by: THORACIC SURGERY (CARDIOTHORACIC VASCULAR SURGERY)

## 2021-03-30 PROCEDURE — 77030006247 HC LD PCMKR MYOCRD BPLR TEMP MEDT -B: Performed by: THORACIC SURGERY (CARDIOTHORACIC VASCULAR SURGERY)

## 2021-03-30 PROCEDURE — 77030019579 HC CBL PACE DISP REMG -B: Performed by: THORACIC SURGERY (CARDIOTHORACIC VASCULAR SURGERY)

## 2021-03-30 PROCEDURE — 5A1221Z PERFORMANCE OF CARDIAC OUTPUT, CONTINUOUS: ICD-10-PCS | Performed by: THORACIC SURGERY (CARDIOTHORACIC VASCULAR SURGERY)

## 2021-03-30 PROCEDURE — 2709999900 HC NON-CHARGEABLE SUPPLY

## 2021-03-30 PROCEDURE — 74011250636 HC RX REV CODE- 250/636: Performed by: THORACIC SURGERY (CARDIOTHORACIC VASCULAR SURGERY)

## 2021-03-30 PROCEDURE — 77030040504 HC DRN WND MDII -B: Performed by: THORACIC SURGERY (CARDIOTHORACIC VASCULAR SURGERY)

## 2021-03-30 PROCEDURE — 77030010938 HC CLP LIG TELE -A: Performed by: THORACIC SURGERY (CARDIOTHORACIC VASCULAR SURGERY)

## 2021-03-30 PROCEDURE — C1751 CATH, INF, PER/CENT/MIDLINE: HCPCS

## 2021-03-30 PROCEDURE — 77030008684 HC TU ET CUF COVD -B: Performed by: ANESTHESIOLOGY

## 2021-03-30 PROCEDURE — 77030002933 HC SUT MCRYL J&J -A: Performed by: THORACIC SURGERY (CARDIOTHORACIC VASCULAR SURGERY)

## 2021-03-30 PROCEDURE — 77030018729 HC ELECTRD DEFIB PAD CARD -B

## 2021-03-30 PROCEDURE — 76060000039 HC ANESTHESIA 4 TO 4.5 HR: Performed by: THORACIC SURGERY (CARDIOTHORACIC VASCULAR SURGERY)

## 2021-03-30 PROCEDURE — 77030021177: Performed by: THORACIC SURGERY (CARDIOTHORACIC VASCULAR SURGERY)

## 2021-03-30 PROCEDURE — 77030041238 HC TBNG INSUF MDII -A: Performed by: THORACIC SURGERY (CARDIOTHORACIC VASCULAR SURGERY)

## 2021-03-30 PROCEDURE — 74011000258 HC RX REV CODE- 258: Performed by: NURSE ANESTHETIST, CERTIFIED REGISTERED

## 2021-03-30 PROCEDURE — 06BP4ZZ EXCISION OF RIGHT SAPHENOUS VEIN, PERCUTANEOUS ENDOSCOPIC APPROACH: ICD-10-PCS | Performed by: THORACIC SURGERY (CARDIOTHORACIC VASCULAR SURGERY)

## 2021-03-30 PROCEDURE — 021109W BYPASS CORONARY ARTERY, TWO ARTERIES FROM AORTA WITH AUTOLOGOUS VENOUS TISSUE, OPEN APPROACH: ICD-10-PCS | Performed by: THORACIC SURGERY (CARDIOTHORACIC VASCULAR SURGERY)

## 2021-03-30 PROCEDURE — 77030002987 HC SUT PROL J&J -B: Performed by: THORACIC SURGERY (CARDIOTHORACIC VASCULAR SURGERY)

## 2021-03-30 PROCEDURE — 77030008771 HC TU NG SALEM SUMP -A: Performed by: ANESTHESIOLOGY

## 2021-03-30 PROCEDURE — 76010000114 HC CV SURG 4 TO 4.5 HR: Performed by: THORACIC SURGERY (CARDIOTHORACIC VASCULAR SURGERY)

## 2021-03-30 PROCEDURE — 77030002973 HC SUT PLEDG CV SFT OVL TELE -B: Performed by: THORACIC SURGERY (CARDIOTHORACIC VASCULAR SURGERY)

## 2021-03-30 PROCEDURE — P9045 ALBUMIN (HUMAN), 5%, 250 ML: HCPCS | Performed by: NURSE ANESTHETIST, CERTIFIED REGISTERED

## 2021-03-30 PROCEDURE — 77030019702 HC WRP THER MENM -C: Performed by: THORACIC SURGERY (CARDIOTHORACIC VASCULAR SURGERY)

## 2021-03-30 PROCEDURE — 93355 ECHO TRANSESOPHAGEAL (TEE): CPT | Performed by: THORACIC SURGERY (CARDIOTHORACIC VASCULAR SURGERY)

## 2021-03-30 PROCEDURE — 77030026438 HC STYL ET INTUB CARD -A: Performed by: ANESTHESIOLOGY

## 2021-03-30 PROCEDURE — 74011250636 HC RX REV CODE- 250/636: Performed by: NURSE PRACTITIONER

## 2021-03-30 PROCEDURE — 77030022199 HC SYS HARV VESL GTNG -G1: Performed by: THORACIC SURGERY (CARDIOTHORACIC VASCULAR SURGERY)

## 2021-03-30 PROCEDURE — 74011250636 HC RX REV CODE- 250/636: Performed by: ANESTHESIOLOGY

## 2021-03-30 PROCEDURE — 77030018729 HC ELECTRD DEFIB PAD CARD -B: Performed by: THORACIC SURGERY (CARDIOTHORACIC VASCULAR SURGERY)

## 2021-03-30 PROCEDURE — 77030010507 HC ADH SKN DERMBND J&J -B: Performed by: THORACIC SURGERY (CARDIOTHORACIC VASCULAR SURGERY)

## 2021-03-30 PROCEDURE — 77030010818: Performed by: THORACIC SURGERY (CARDIOTHORACIC VASCULAR SURGERY)

## 2021-03-30 PROCEDURE — 82803 BLOOD GASES ANY COMBINATION: CPT

## 2021-03-30 PROCEDURE — 77030010605 HC BLWR MR MAL MEDT -B: Performed by: THORACIC SURGERY (CARDIOTHORACIC VASCULAR SURGERY)

## 2021-03-30 PROCEDURE — 82962 GLUCOSE BLOOD TEST: CPT

## 2021-03-30 PROCEDURE — 85014 HEMATOCRIT: CPT

## 2021-03-30 PROCEDURE — 80053 COMPREHEN METABOLIC PANEL: CPT

## 2021-03-30 PROCEDURE — 74011250637 HC RX REV CODE- 250/637: Performed by: NURSE PRACTITIONER

## 2021-03-30 PROCEDURE — 02100Z9 BYPASS CORONARY ARTERY, ONE ARTERY FROM LEFT INTERNAL MAMMARY, OPEN APPROACH: ICD-10-PCS | Performed by: THORACIC SURGERY (CARDIOTHORACIC VASCULAR SURGERY)

## 2021-03-30 PROCEDURE — 77030006920 HC BLD STRNL SAW STRY -B: Performed by: THORACIC SURGERY (CARDIOTHORACIC VASCULAR SURGERY)

## 2021-03-30 DEVICE — TEMP PACING WIRE
Type: IMPLANTABLE DEVICE | Status: FUNCTIONAL
Brand: MYO/WIRE

## 2021-03-30 DEVICE — LEAD PCMKR MYOCARDL BPLR TEMP. --: Type: IMPLANTABLE DEVICE | Status: FUNCTIONAL

## 2021-03-30 DEVICE — 6 FOOT DISPOSABLE EXTENSION CABLE WITH SAFE CONNECT / SCREW-DOWN: Type: IMPLANTABLE DEVICE | Status: FUNCTIONAL

## 2021-03-30 DEVICE — PUTTY BONE HEMSTAT ABSORB MTRX 2.0GRAM: Type: IMPLANTABLE DEVICE | Status: FUNCTIONAL

## 2021-03-30 RX ORDER — MIDAZOLAM HYDROCHLORIDE 1 MG/ML
INJECTION, SOLUTION INTRAMUSCULAR; INTRAVENOUS AS NEEDED
Status: DISCONTINUED | OUTPATIENT
Start: 2021-03-30 | End: 2021-03-30 | Stop reason: HOSPADM

## 2021-03-30 RX ORDER — NALOXONE HYDROCHLORIDE 0.4 MG/ML
0.4 INJECTION, SOLUTION INTRAMUSCULAR; INTRAVENOUS; SUBCUTANEOUS AS NEEDED
Status: DISCONTINUED | OUTPATIENT
Start: 2021-03-30 | End: 2021-04-05 | Stop reason: HOSPADM

## 2021-03-30 RX ORDER — PROPOFOL 10 MG/ML
INJECTION, EMULSION INTRAVENOUS AS NEEDED
Status: DISCONTINUED | OUTPATIENT
Start: 2021-03-30 | End: 2021-03-30 | Stop reason: HOSPADM

## 2021-03-30 RX ORDER — SODIUM CHLORIDE, SODIUM LACTATE, POTASSIUM CHLORIDE, CALCIUM CHLORIDE 600; 310; 30; 20 MG/100ML; MG/100ML; MG/100ML; MG/100ML
INJECTION, SOLUTION INTRAVENOUS
Status: DISCONTINUED | OUTPATIENT
Start: 2021-03-30 | End: 2021-03-30 | Stop reason: HOSPADM

## 2021-03-30 RX ORDER — POTASSIUM CHLORIDE 29.8 MG/ML
20 INJECTION INTRAVENOUS
Status: ACTIVE | OUTPATIENT
Start: 2021-03-30 | End: 2021-03-31

## 2021-03-30 RX ORDER — DIPHENHYDRAMINE HYDROCHLORIDE 50 MG/ML
25 INJECTION, SOLUTION INTRAMUSCULAR; INTRAVENOUS
Status: DISCONTINUED | OUTPATIENT
Start: 2021-03-30 | End: 2021-04-05 | Stop reason: HOSPADM

## 2021-03-30 RX ORDER — SUFENTANIL CITRATE 50 UG/ML
INJECTION EPIDURAL; INTRAVENOUS AS NEEDED
Status: DISCONTINUED | OUTPATIENT
Start: 2021-03-30 | End: 2021-03-30 | Stop reason: HOSPADM

## 2021-03-30 RX ORDER — ACETAMINOPHEN 325 MG/1
650 TABLET ORAL ONCE
Status: DISCONTINUED | OUTPATIENT
Start: 2021-03-30 | End: 2021-03-30 | Stop reason: HOSPADM

## 2021-03-30 RX ORDER — SODIUM CHLORIDE 0.9 % (FLUSH) 0.9 %
5-40 SYRINGE (ML) INJECTION AS NEEDED
Status: DISCONTINUED | OUTPATIENT
Start: 2021-03-30 | End: 2021-04-05 | Stop reason: HOSPADM

## 2021-03-30 RX ORDER — MIDAZOLAM HYDROCHLORIDE 1 MG/ML
1 INJECTION, SOLUTION INTRAMUSCULAR; INTRAVENOUS
Status: DISCONTINUED | OUTPATIENT
Start: 2021-03-30 | End: 2021-04-03

## 2021-03-30 RX ORDER — FENTANYL CITRATE 50 UG/ML
50 INJECTION, SOLUTION INTRAMUSCULAR; INTRAVENOUS AS NEEDED
Status: DISCONTINUED | OUTPATIENT
Start: 2021-03-30 | End: 2021-03-30 | Stop reason: HOSPADM

## 2021-03-30 RX ORDER — SODIUM CHLORIDE 0.9 % (FLUSH) 0.9 %
5-40 SYRINGE (ML) INJECTION EVERY 8 HOURS
Status: DISCONTINUED | OUTPATIENT
Start: 2021-03-30 | End: 2021-03-30 | Stop reason: HOSPADM

## 2021-03-30 RX ORDER — DEXTROSE 50 % IN WATER (D50W) INTRAVENOUS SYRINGE
12.5-25 AS NEEDED
Status: DISCONTINUED | OUTPATIENT
Start: 2021-03-30 | End: 2021-04-05 | Stop reason: HOSPADM

## 2021-03-30 RX ORDER — SODIUM CHLORIDE 0.9 % (FLUSH) 0.9 %
5-40 SYRINGE (ML) INJECTION EVERY 8 HOURS
Status: DISCONTINUED | OUTPATIENT
Start: 2021-03-30 | End: 2021-04-05 | Stop reason: HOSPADM

## 2021-03-30 RX ORDER — MORPHINE SULFATE 4 MG/ML
4 INJECTION INTRAVENOUS
Status: DISCONTINUED | OUTPATIENT
Start: 2021-03-30 | End: 2021-04-03

## 2021-03-30 RX ORDER — ALBUMIN HUMAN 50 G/1000ML
SOLUTION INTRAVENOUS AS NEEDED
Status: DISCONTINUED | OUTPATIENT
Start: 2021-03-30 | End: 2021-03-30 | Stop reason: HOSPADM

## 2021-03-30 RX ORDER — FAMOTIDINE 20 MG/1
20 TABLET, FILM COATED ORAL EVERY 12 HOURS
Status: DISCONTINUED | OUTPATIENT
Start: 2021-03-31 | End: 2021-04-01

## 2021-03-30 RX ORDER — HEPARIN SODIUM 1000 [USP'U]/ML
INJECTION, SOLUTION INTRAVENOUS; SUBCUTANEOUS AS NEEDED
Status: DISCONTINUED | OUTPATIENT
Start: 2021-03-30 | End: 2021-03-30 | Stop reason: HOSPADM

## 2021-03-30 RX ORDER — DIPHENHYDRAMINE HCL 25 MG
25 CAPSULE ORAL
Status: DISCONTINUED | OUTPATIENT
Start: 2021-03-30 | End: 2021-04-05 | Stop reason: HOSPADM

## 2021-03-30 RX ORDER — GUAIFENESIN 100 MG/5ML
81 LIQUID (ML) ORAL DAILY
Status: DISCONTINUED | OUTPATIENT
Start: 2021-03-31 | End: 2021-04-05 | Stop reason: HOSPADM

## 2021-03-30 RX ORDER — AMIODARONE HYDROCHLORIDE 200 MG/1
400 TABLET ORAL EVERY 12 HOURS
Status: DISCONTINUED | OUTPATIENT
Start: 2021-03-31 | End: 2021-04-05 | Stop reason: HOSPADM

## 2021-03-30 RX ORDER — AMOXICILLIN 250 MG
1 CAPSULE ORAL 2 TIMES DAILY
Status: DISCONTINUED | OUTPATIENT
Start: 2021-03-31 | End: 2021-04-05 | Stop reason: HOSPADM

## 2021-03-30 RX ORDER — ALBUTEROL SULFATE 0.83 MG/ML
2.5 SOLUTION RESPIRATORY (INHALATION)
Status: DISCONTINUED | OUTPATIENT
Start: 2021-03-30 | End: 2021-04-05 | Stop reason: HOSPADM

## 2021-03-30 RX ORDER — SODIUM CHLORIDE 450 MG/100ML
10 INJECTION, SOLUTION INTRAVENOUS CONTINUOUS
Status: DISCONTINUED | OUTPATIENT
Start: 2021-03-30 | End: 2021-03-31

## 2021-03-30 RX ORDER — ONDANSETRON 2 MG/ML
4 INJECTION INTRAMUSCULAR; INTRAVENOUS
Status: DISCONTINUED | OUTPATIENT
Start: 2021-03-30 | End: 2021-04-05 | Stop reason: HOSPADM

## 2021-03-30 RX ORDER — MAGNESIUM SULFATE 1 G/100ML
1 INJECTION INTRAVENOUS AS NEEDED
Status: DISCONTINUED | OUTPATIENT
Start: 2021-03-30 | End: 2021-04-03

## 2021-03-30 RX ORDER — INSULIN LISPRO 100 [IU]/ML
INJECTION, SOLUTION INTRAVENOUS; SUBCUTANEOUS
Status: DISCONTINUED | OUTPATIENT
Start: 2021-03-30 | End: 2021-04-01

## 2021-03-30 RX ORDER — ALBUMIN HUMAN 50 G/1000ML
12.5 SOLUTION INTRAVENOUS
Status: COMPLETED | OUTPATIENT
Start: 2021-03-30 | End: 2021-03-31

## 2021-03-30 RX ORDER — MUPIROCIN 20 MG/G
OINTMENT TOPICAL 2 TIMES DAILY
Status: DISPENSED | OUTPATIENT
Start: 2021-03-30 | End: 2021-04-04

## 2021-03-30 RX ORDER — ROCURONIUM BROMIDE 10 MG/ML
INJECTION, SOLUTION INTRAVENOUS AS NEEDED
Status: DISCONTINUED | OUTPATIENT
Start: 2021-03-30 | End: 2021-03-30 | Stop reason: HOSPADM

## 2021-03-30 RX ORDER — LIDOCAINE HYDROCHLORIDE 10 MG/ML
0.1 INJECTION, SOLUTION EPIDURAL; INFILTRATION; INTRACAUDAL; PERINEURAL AS NEEDED
Status: DISCONTINUED | OUTPATIENT
Start: 2021-03-30 | End: 2021-03-30 | Stop reason: HOSPADM

## 2021-03-30 RX ORDER — LANOLIN ALCOHOL/MO/W.PET/CERES
400 CREAM (GRAM) TOPICAL 2 TIMES DAILY
Status: DISCONTINUED | OUTPATIENT
Start: 2021-03-31 | End: 2021-04-05 | Stop reason: HOSPADM

## 2021-03-30 RX ORDER — FACIAL-BODY WIPES
10 EACH TOPICAL DAILY PRN
Status: DISCONTINUED | OUTPATIENT
Start: 2021-03-30 | End: 2021-04-05 | Stop reason: HOSPADM

## 2021-03-30 RX ORDER — SODIUM CHLORIDE 9 MG/ML
INJECTION, SOLUTION INTRAVENOUS
Status: DISCONTINUED | OUTPATIENT
Start: 2021-03-30 | End: 2021-03-30 | Stop reason: HOSPADM

## 2021-03-30 RX ORDER — GLYCOPYRROLATE 0.2 MG/ML
INJECTION INTRAMUSCULAR; INTRAVENOUS AS NEEDED
Status: DISCONTINUED | OUTPATIENT
Start: 2021-03-30 | End: 2021-03-30 | Stop reason: HOSPADM

## 2021-03-30 RX ORDER — PROTAMINE SULFATE 10 MG/ML
INJECTION, SOLUTION INTRAVENOUS AS NEEDED
Status: DISCONTINUED | OUTPATIENT
Start: 2021-03-30 | End: 2021-03-30 | Stop reason: HOSPADM

## 2021-03-30 RX ORDER — SUFENTANIL CITRATE 50 UG/ML
INJECTION EPIDURAL; INTRAVENOUS
Status: DISCONTINUED | OUTPATIENT
Start: 2021-03-30 | End: 2021-03-30 | Stop reason: HOSPADM

## 2021-03-30 RX ORDER — INSULIN GLARGINE 100 [IU]/ML
1-50 INJECTION, SOLUTION SUBCUTANEOUS
Status: ACTIVE | OUTPATIENT
Start: 2021-03-30 | End: 2021-03-31

## 2021-03-30 RX ORDER — CHLORHEXIDINE GLUCONATE 1.2 MG/ML
10 RINSE ORAL EVERY 12 HOURS
Status: DISCONTINUED | OUTPATIENT
Start: 2021-03-30 | End: 2021-04-05 | Stop reason: HOSPADM

## 2021-03-30 RX ORDER — SODIUM CHLORIDE 9 MG/ML
9 INJECTION, SOLUTION INTRAVENOUS CONTINUOUS
Status: DISCONTINUED | OUTPATIENT
Start: 2021-03-30 | End: 2021-04-01

## 2021-03-30 RX ORDER — HYDROMORPHONE HYDROCHLORIDE 2 MG/ML
INJECTION, SOLUTION INTRAMUSCULAR; INTRAVENOUS; SUBCUTANEOUS AS NEEDED
Status: DISCONTINUED | OUTPATIENT
Start: 2021-03-30 | End: 2021-03-30 | Stop reason: HOSPADM

## 2021-03-30 RX ORDER — MIDAZOLAM HYDROCHLORIDE 1 MG/ML
3 INJECTION, SOLUTION INTRAMUSCULAR; INTRAVENOUS ONCE
Status: COMPLETED | OUTPATIENT
Start: 2021-03-30 | End: 2021-03-30

## 2021-03-30 RX ORDER — SODIUM CHLORIDE, SODIUM LACTATE, POTASSIUM CHLORIDE, CALCIUM CHLORIDE 600; 310; 30; 20 MG/100ML; MG/100ML; MG/100ML; MG/100ML
50 INJECTION, SOLUTION INTRAVENOUS CONTINUOUS
Status: DISCONTINUED | OUTPATIENT
Start: 2021-03-30 | End: 2021-03-30 | Stop reason: HOSPADM

## 2021-03-30 RX ORDER — LIDOCAINE HYDROCHLORIDE 20 MG/ML
INJECTION, SOLUTION EPIDURAL; INFILTRATION; INTRACAUDAL; PERINEURAL AS NEEDED
Status: DISCONTINUED | OUTPATIENT
Start: 2021-03-30 | End: 2021-03-30 | Stop reason: HOSPADM

## 2021-03-30 RX ORDER — DESMOPRESSIN ACETATE 4 UG/ML
INJECTION, SOLUTION INTRAVENOUS; SUBCUTANEOUS AS NEEDED
Status: DISCONTINUED | OUTPATIENT
Start: 2021-03-30 | End: 2021-03-30 | Stop reason: HOSPADM

## 2021-03-30 RX ORDER — OXYCODONE HYDROCHLORIDE 5 MG/1
5 TABLET ORAL
Status: DISCONTINUED | OUTPATIENT
Start: 2021-03-30 | End: 2021-04-05 | Stop reason: HOSPADM

## 2021-03-30 RX ORDER — POLYETHYLENE GLYCOL 3350 17 G/17G
17 POWDER, FOR SOLUTION ORAL DAILY
Status: DISCONTINUED | OUTPATIENT
Start: 2021-03-31 | End: 2021-04-05 | Stop reason: HOSPADM

## 2021-03-30 RX ORDER — NEOSTIGMINE METHYLSULFATE 1 MG/ML
INJECTION, SOLUTION INTRAVENOUS AS NEEDED
Status: DISCONTINUED | OUTPATIENT
Start: 2021-03-30 | End: 2021-03-30 | Stop reason: HOSPADM

## 2021-03-30 RX ORDER — BACITRACIN 500 UNIT/G
1 PACKET (EA) TOPICAL AS NEEDED
Status: DISCONTINUED | OUTPATIENT
Start: 2021-03-30 | End: 2021-04-05 | Stop reason: HOSPADM

## 2021-03-30 RX ORDER — OXYCODONE HYDROCHLORIDE 5 MG/1
10 TABLET ORAL
Status: DISCONTINUED | OUTPATIENT
Start: 2021-03-30 | End: 2021-04-05 | Stop reason: HOSPADM

## 2021-03-30 RX ORDER — MORPHINE SULFATE 2 MG/ML
2 INJECTION, SOLUTION INTRAMUSCULAR; INTRAVENOUS
Status: DISCONTINUED | OUTPATIENT
Start: 2021-03-30 | End: 2021-04-03

## 2021-03-30 RX ORDER — MAGNESIUM SULFATE 100 %
4 CRYSTALS MISCELLANEOUS AS NEEDED
Status: DISCONTINUED | OUTPATIENT
Start: 2021-03-30 | End: 2021-04-05 | Stop reason: HOSPADM

## 2021-03-30 RX ORDER — MIDAZOLAM HYDROCHLORIDE 1 MG/ML
1 INJECTION, SOLUTION INTRAMUSCULAR; INTRAVENOUS AS NEEDED
Status: COMPLETED | OUTPATIENT
Start: 2021-03-30 | End: 2021-03-30

## 2021-03-30 RX ORDER — ACETAMINOPHEN 325 MG/1
650 TABLET ORAL EVERY 4 HOURS
Status: DISCONTINUED | OUTPATIENT
Start: 2021-03-30 | End: 2021-03-31

## 2021-03-30 RX ORDER — LANOLIN ALCOHOL/MO/W.PET/CERES
3 CREAM (GRAM) TOPICAL
Status: DISCONTINUED | OUTPATIENT
Start: 2021-03-30 | End: 2021-04-05 | Stop reason: HOSPADM

## 2021-03-30 RX ORDER — SODIUM CHLORIDE 0.9 % (FLUSH) 0.9 %
5-40 SYRINGE (ML) INJECTION AS NEEDED
Status: DISCONTINUED | OUTPATIENT
Start: 2021-03-30 | End: 2021-03-30 | Stop reason: HOSPADM

## 2021-03-30 RX ADMIN — SODIUM CHLORIDE 10 G/HR: 900 INJECTION, SOLUTION INTRAVENOUS at 12:00

## 2021-03-30 RX ADMIN — SODIUM CHLORIDE, POTASSIUM CHLORIDE, SODIUM LACTATE AND CALCIUM CHLORIDE: 600; 310; 30; 20 INJECTION, SOLUTION INTRAVENOUS at 11:33

## 2021-03-30 RX ADMIN — LIDOCAINE HYDROCHLORIDE 100 MG: 20 INJECTION, SOLUTION EPIDURAL; INFILTRATION; INTRACAUDAL; PERINEURAL at 11:41

## 2021-03-30 RX ADMIN — HEPARIN SODIUM 25000 UNITS: 1000 INJECTION, SOLUTION INTRAVENOUS; SUBCUTANEOUS at 12:36

## 2021-03-30 RX ADMIN — SODIUM CHLORIDE 10 ML/HR: 4.5 INJECTION, SOLUTION INTRAVENOUS at 16:00

## 2021-03-30 RX ADMIN — SUFENTANIL CITRATE 50 MCG: 50 INJECTION EPIDURAL; INTRAVENOUS at 11:41

## 2021-03-30 RX ADMIN — ROCURONIUM BROMIDE 100 MG: 10 SOLUTION INTRAVENOUS at 11:41

## 2021-03-30 RX ADMIN — DEXMEDETOMIDINE HYDROCHLORIDE 0.7 MCG/KG/HR: 100 INJECTION, SOLUTION, CONCENTRATE INTRAVENOUS at 13:37

## 2021-03-30 RX ADMIN — OXYCODONE 5 MG: 5 TABLET ORAL at 23:06

## 2021-03-30 RX ADMIN — Medication 40 ML: at 17:00

## 2021-03-30 RX ADMIN — CEFAZOLIN SODIUM 2 G: 1 INJECTION, POWDER, FOR SOLUTION INTRAMUSCULAR; INTRAVENOUS at 17:17

## 2021-03-30 RX ADMIN — METOPROLOL TARTRATE 25 MG: 25 TABLET, FILM COATED ORAL at 08:49

## 2021-03-30 RX ADMIN — MORPHINE SULFATE 2 MG: 2 INJECTION, SOLUTION INTRAMUSCULAR; INTRAVENOUS at 16:31

## 2021-03-30 RX ADMIN — SODIUM CHLORIDE: 900 INJECTION, SOLUTION INTRAVENOUS at 11:33

## 2021-03-30 RX ADMIN — FAMOTIDINE 20 MG: 10 INJECTION, SOLUTION INTRAVENOUS at 20:30

## 2021-03-30 RX ADMIN — DESMOPRESSIN ACETATE 22 MCG: 4 SOLUTION INTRAVENOUS at 14:35

## 2021-03-30 RX ADMIN — PROPOFOL 30 MG: 10 INJECTION, EMULSION INTRAVENOUS at 12:14

## 2021-03-30 RX ADMIN — ALBUMIN (HUMAN) 12.5 G: 12.5 INJECTION, SOLUTION INTRAVENOUS at 17:17

## 2021-03-30 RX ADMIN — HEPARIN SODIUM 1000 UNITS: 1000 INJECTION, SOLUTION INTRAVENOUS; SUBCUTANEOUS at 12:10

## 2021-03-30 RX ADMIN — MIDAZOLAM 2 MG: 1 INJECTION INTRAMUSCULAR; INTRAVENOUS at 11:33

## 2021-03-30 RX ADMIN — MIDAZOLAM HYDROCHLORIDE 1 MG: 1 INJECTION, SOLUTION INTRAMUSCULAR; INTRAVENOUS at 09:56

## 2021-03-30 RX ADMIN — CHLORHEXIDINE GLUCONATE 10 ML: 1.2 RINSE ORAL at 20:30

## 2021-03-30 RX ADMIN — GLYCOPYRROLATE 0.4 MG: 0.2 INJECTION INTRAMUSCULAR; INTRAVENOUS at 16:06

## 2021-03-30 RX ADMIN — ALBUMIN (HUMAN) 250 ML: 12.5 INJECTION, SOLUTION INTRAVENOUS at 14:57

## 2021-03-30 RX ADMIN — ROCURONIUM BROMIDE 20 MG: 10 SOLUTION INTRAVENOUS at 14:53

## 2021-03-30 RX ADMIN — MIDAZOLAM HYDROCHLORIDE 1 MG: 1 INJECTION, SOLUTION INTRAMUSCULAR; INTRAVENOUS at 09:41

## 2021-03-30 RX ADMIN — ALBUMIN (HUMAN) 12.5 G: 12.5 INJECTION, SOLUTION INTRAVENOUS at 20:30

## 2021-03-30 RX ADMIN — FENTANYL CITRATE 100 MCG: 50 INJECTION, SOLUTION INTRAMUSCULAR; INTRAVENOUS at 10:04

## 2021-03-30 RX ADMIN — SUFENTANIL CITRATE 20 MCG: 50 INJECTION EPIDURAL; INTRAVENOUS at 14:31

## 2021-03-30 RX ADMIN — MIDAZOLAM HYDROCHLORIDE 3 MG: 1 INJECTION, SOLUTION INTRAMUSCULAR; INTRAVENOUS at 10:05

## 2021-03-30 RX ADMIN — Medication 10 ML: at 03:32

## 2021-03-30 RX ADMIN — PROTAMINE SULFATE 275 MG: 10 INJECTION, SOLUTION INTRAVENOUS at 14:29

## 2021-03-30 RX ADMIN — PHENYLEPHRINE HYDROCHLORIDE 30 MCG/MIN: 10 INJECTION INTRAVENOUS at 11:44

## 2021-03-30 RX ADMIN — WATER 2 G: 1 INJECTION INTRAMUSCULAR; INTRAVENOUS; SUBCUTANEOUS at 12:00

## 2021-03-30 RX ADMIN — ACETAMINOPHEN 650 MG: 325 TABLET ORAL at 17:17

## 2021-03-30 RX ADMIN — SUFENTANIL CITRATE 0.3 MCG/KG/HR: 50 INJECTION EPIDURAL; INTRAVENOUS at 11:50

## 2021-03-30 RX ADMIN — SUFENTANIL CITRATE 30 MCG: 50 INJECTION EPIDURAL; INTRAVENOUS at 12:14

## 2021-03-30 RX ADMIN — Medication 3 MG: at 16:06

## 2021-03-30 RX ADMIN — PROPOFOL 100 MG: 10 INJECTION, EMULSION INTRAVENOUS at 11:41

## 2021-03-30 RX ADMIN — MUPIROCIN: 20 OINTMENT TOPICAL at 08:50

## 2021-03-30 RX ADMIN — ALBUMIN (HUMAN) 12.5 G: 12.5 INJECTION, SOLUTION INTRAVENOUS at 16:15

## 2021-03-30 RX ADMIN — HYDROMORPHONE HYDROCHLORIDE 0.5 MG: 2 INJECTION, SOLUTION INTRAMUSCULAR; INTRAVENOUS; SUBCUTANEOUS at 15:30

## 2021-03-30 RX ADMIN — ACETAMINOPHEN 650 MG: 325 TABLET ORAL at 23:05

## 2021-03-30 NOTE — ANESTHESIA PROCEDURE NOTES
YOJANA  Date/Time: 3/30/2021 2:08 PM      Procedure Details: probe placement, image aquisition & interpretation    Risks and benefits discussed with the patient and plans are to proceed    Procedure Note    Performed by: Bette Marks MD  Authorized by: Bette Marks MD       Indications: assessment of ascending aorta and assessment of surgical repair  Modalities: 2D, CF, CWD, PWD  Probe Type: biplane, multiplane and epiaortic  Insertion: atraumatic  Patient Status: intubated and sedated    Echocardiographic and Doppler Measurements   Aorta  Size  Diam(cm)  Dissection PlaqueThick(mm)  Plaque Mobile    Ascending normal  No  No    Arch normal  No  No    Descending normal  No  No          Valves  Annulus  Stenosis  Area/Grad  Regurg  Leaflet   Morph  Leaflet   Motion    Aortic normal none  1+ normal normal    Mitral normal none  1+ normal normal    Tricuspid normal none  0 normal normal          Atria  Size  SEC (smoke)  Thrombus  Tumor  Device    Rt Atrium normal No No  No    Lt Atrium normal No No  No     Interatrial Septum Morphology: normal    Interventricular Septum Morphology: normal    Ventricle  Cavity Size  Cavity Dimension Hypertrophy  Thrombus  Gloal FXN  EF    RV normal  No no normal     LV normal   No normal >55%       Regional Function  (1 = normal, 2 = mildly hypokinetic, 3 = severely hypokinetic, 4 = akinetic, 5 = dyskinetic) LAV - [place]   ME LAV = 0  ME LAV = 90  ME LAV = 130   Basal Sept:1 Basal Ant:1 Basal Post:1   Mid Sept:1 Mid Ant:1 Mid Post:1   Apical Sept:1 Apical Ant:1 Basal Ant Sept:1   Basal Lat:1 Basal Inf:1 Mid Ant Sept:1   Mid Lat:1 Mid Inf:1    Apical Lat:1 Apical Inf:1        Pericardium: normal    Post Intervention Follow-up Study  Ventricular Global Function: unchanged  Ventricular Regional Function: unchanged     Valve  Function  Regurgitation  Area    Aortic no change 1+     Mitral no change 1+     Tricuspid  1+     Prosthetic        Complications: None  Comments: Pre Exam- Normal LVEF >55%, no WMA. Normal RV function. Normal aortic valve with trace AI, Normal mitral valve with trace MR, normal tricuspid valve. Normal atrium, no PFO, YASMINE no thrombus. No effusion. Normal aorta. Post Exam- Normal LVEF with no new WMA. Normal RV function. Rest of exam unchanged.

## 2021-03-30 NOTE — ANESTHESIA PROCEDURE NOTES
Central Line Placement With PA Catheter    Start time: 3/30/2021 11:09 AM  Performed by: Shorty Patel MD  Authorized by: Shorty Patel MD     Indications: vascular access, central pressure monitoring and need for vasopressors  Preanesthetic Checklist: patient identified, risks and benefits discussed, anesthesia consent, site marked, patient being monitored and timeout performed    Timeout Time: 11:09       Pre-procedure: All elements of maximal sterile barrier technique followed?  Yes    2% Chlorhexidine for cutaneous antisepsis, Hand hygiene performed prior to catheter insertion and Ultrasound guidance    Sterile Ultrasound Technique followed?: Yes            Procedure:   Prep:  Chlorhexidine  Location: internal jugular  Orientation:  Right  Patient position:  Trendelenburg  Catheter type:  Double lumen  Catheter size:  9 Fr  Catheter length:  12 cm  Number of attempts:  1  Successful placement: Yes      Assessment:   Post-procedure:  Catheter secured and sterile dressing applied  Assessment:  Blood return through all ports, free fluid flow and guidewire removal verified  Insertion:  Uncomplicated  Patient tolerance:  Patient tolerated the procedure well with no immediate complications  8 Fr CCO PAC

## 2021-03-30 NOTE — PROGRESS NOTES
0730: Bedside and Verbal shift change report given to [address] [place] [address] Avenue (oncoming nurse) by Michael Solo (offgoing nurse). Report included the following information SBAR, Kardex, Intake/Output, MAR, Accordion, Recent Results and Cardiac Rhythm NSR.     0843: TRANSFER - OUT REPORT:    Verbal report given to ODILIA Ayala(name) on Darell Ortega  being transferred to OR Holding(unit) for routine progression of care       Report consisted of patients Situation, Background, Assessment and   Recommendations(SBAR). Information from the following report(s) SBAR, Kardex, Intake/Output, MAR, Accordion, Recent Results and Cardiac Rhythm NSR was reviewed with the receiving nurse. Opportunity for questions and clarification was provided.       Patient transported with:   Monitor  Registered Nurse

## 2021-03-30 NOTE — PROGRESS NOTES
1600  pt arrive, bedside handoff w/ MD, CRNA; see assessment; DD @ 80 for report of sign 1st AVB  1615  Gluc 125, will start insulin infusion per protocol  1700  pt responses to verbal commands, calm & appropriate, denies pain; BP remains somewhat labile, dec to 70-80s sys w waking, only req sm adjustments in madi  1815 bath complete, pacer to AAI @ 88, precedex d/c, denies pain, IMV 8, 50%  1845 pt responsive to voice but too sleepy to maintain spont rate above set rate, returned to IMV 12, 50%  1930  pt more alert, interactive, denies pain, IMV to 6   2000 Bedside and Verbal shift change report given to Juan C Silva I (oncoming nurse). Report given with SBAR, Kardex, OR Summary, Intake/Output, MAR, Recent Results, Med Rec Status and Cardiac Rhythm paced.

## 2021-03-30 NOTE — PERIOP NOTES
Report called to [facility], CVICU RN. Information given to include patient's name, age, allergies, height, weight, PMH, invasive lines, procedure, drains and anesthesia drips.

## 2021-03-30 NOTE — ANESTHESIA POSTPROCEDURE EVALUATION
Post-Anesthesia Evaluation and Assessment    Patient: Flory Ricardo MRN: [id number]  SSN: [id number]    YOB: 1955  Age: 76 y.o. Sex: female      I have evaluated the patient and they are stable and ready for discharge from the PACU. Cardiovascular Function/Vital Signs  Visit Vitals  /66   Pulse 83   Temp 36.5 °C (97.7 °F)   Resp 20   Ht 5' 4\" (1.626 m)   Wt 72.6 kg (160 lb 0.9 oz)   SpO2 99%   BMI 27.47 kg/m²       Patient is status post General anesthesia for Procedure(s):  CABG X 3 WITH CPB: DONOR SITES: LIMA AND RIGHT SAPHENOUS VEIN VIA EVH; YOJANA AND EPIAORTIC BY DR. Florentino Barrett. Nausea/Vomiting: None    Postoperative hydration reviewed and adequate. Pain:  Pain Scale 1: Adult Nonverbal Pain Scale (03/30/21 1605)  Pain Intensity 1: 0 (03/30/21 0927)   Managed    Neurological Status:       sedated    Mental Status, Level of Consciousness: sedated    Pulmonary Status:   O2 Device: Nasal cannula (03/30/21 1041)   Adequate oxygenation and airway patent    Complications related to anesthesia: None    Post-anesthesia assessment completed. No concerns    Signed By: Jude Hernandez MD     March 30, 2021              Procedure(s):  CABG X 3 WITH CPB: DONOR SITES: LIMA AND RIGHT SAPHENOUS VEIN VIA EVH; YOJANA AND EPIAORTIC BY DR. Florentino Barrett. general    <BSHSIANPOST>    INITIAL Post-op Vital signs:   Vitals Value Taken Time   /66 03/30/21 1607   Temp 36.5 °C (97.7 °F) 03/30/21 1607   Pulse 80 03/30/21 1642   Resp 16 03/30/21 1642   SpO2 98 % 03/30/21 1642   Vitals shown include unvalidated device data.

## 2021-03-30 NOTE — CONSULTS
[facility]    ICU TEAM Consult    Name: Harper Santillan   : 1955   MRN: [id number]   Date: 3/30/2021      Assessment:   Reason for ICU Admission:     HPI: 77 y/o F with history of HLD and CAD presents to the ICU following uncomplicated CABG. POD:  Day of Surgery    S/P:   Procedure(s):  CABG X 3 WITH CPB: DONOR SITES: LIMA AND RIGHT SAPHENOUS VEIN VIA EVH; YOJANA AND EPIAORTIC BY DR. Luis Epps    - Coronary Artery Disease s/p 3v CABG  - Acute Post-operative Respiratory Failure  - Acute Postoperative Pain  - Hyperlipidemia      Plan:     Neuro: analgosedation with precedex, propofol, morphine. Start SATs. Pulm: SBT, WTE per pathway. Pre-extubation ABG. Titrate FiO2 for goal SPO2> 90%,VAP prevention bundle, head of the bed at 30' all times. Chest tube mgmt per CTS. Cardiac: MAP goal >65, prn NE, epi, vaso, Milrinone. CVC+PAC, arterial line. Renal: Vargas, daily renal panel. Avoid NSAIDs  GI: OGT, NPO given meds  ID: no active issue. Perioperative antibiotics.    Heme: Transfuse for Hgb <7, statin, AP per CTS  Endo: SSI  MSK: PT/OT when able    F - Feeding:  Pending   A - Analgesia: Fentanyl, Oxycodone and Acetaminophen  S - Sedation: Precedex  T - DVT Prophylaxis: SCD's or Sequential Compression Device   C - Code Status: Full Code  H - Head of Bed: > 30 Degrees  U - Ulcer Prophylaxis: Protonix (pantoprazole)   G - Glycemic Control: Insulin  S - Spontaneous Breathing Trial: Yes  B - Bowel Regimen: Senna and MiraLax  I - Indwelling Catheter:   Tubes: ETT, Chest Tube x3 and Orogastric Tube  Lines: Peripheral IV, Arterial Line and Central Line  Drains: Vargas Catheter  D - De-escalation of Antibiotics:       Active Problem List:     Problem List  Never Reviewed          Codes Class    CAD (coronary artery disease) ICD-10-CM: I25.10  ICD-9-CM: 414.00         Unstable angina (HCC) ICD-10-CM: I20.0  ICD-9-CM: 411.1         S/P CABG x 3 ICD-10-CM: Z95.1  ICD-9-CM: V45.81         HLD (hyperlipidemia) ICD-10-CM: E78.5  ICD-9-CM: 272.4               Past Medical History:    has a past medical history of Hyperlipidemia, Murmur, and Pyloric stenosis. Past Surgical History:    has a past surgical history that includes hx open cholecystectomy (). Home Medications:     Prior to Admission medications    Medication Sig Start Date End Date Taking? Authorizing Provider   therapeutic multivitamin [facility]) tablet Take 1 Tab by mouth daily. Yes Provider, Historical   atorvastatin (LIPITOR) 20 mg tablet Take 40 mg by mouth daily. Provider, Historical   aspirin 81 mg chewable tablet Take 81 mg by mouth daily. Provider, Historical   metoprolol tartrate (LOPRESSOR) 25 mg tablet Take 25 mg by mouth two (2) times a day. Provider, Historical       Allergies/Social/Family History:   No Known Allergies   Social History     Tobacco Use    Smoking status: Never Smoker    Smokeless tobacco: Never Used   Substance Use Topics    Alcohol use: Never     Frequency: Never      Family History   Problem Relation Age of Onset    Hypertension Mother     Arrhythmia Mother     Alzheimer Father        Review of Systems:   Review of systems not obtained due to patient factors.     Objective:     Visit Vitals  /66   Pulse 80   Temp 97 °F (36.1 °C)   Resp 16   Ht 5' 4\" (1.626 m)   Wt 72.6 kg (160 lb 0.9 oz)   SpO2 98%   BMI 27.47 kg/m²    O2 Flow Rate (L/min): 3 l/min O2 Device: Nasal cannula Temp (24hrs), Av.7 °F (36.5 °C), Min:97 °F (36.1 °C), Max:98.5 °F (36.9 °C)    CVP (mmHg): 11 mmHg (21 164)      Hemodynamics:   PAP: PAP Systolic: 28 (57/27/90 4959) CO: CO (l/min): 4 l/min (21 161)   Wedge:   CI: CI (l/min/m2): 2.2 l/min/m2 (21)   CVP:  CVP (mmHg): 11 mmHg (21) SVR:       PVR:       Ventilator Settings:  Mode Rate Tidal Volume Pressure FiO2 PEEP   SIMV   470 ml  5 cm H2O 80 % 5 cm H20     Peak airway pressure: 21 cm H2O    Minute ventilation: 7.45 l/min      Intake/Output: Intake/Output Summary (Last 24 hours) at 3/30/2021 1657  Last data filed at 3/30/2021 1648  Gross per 24 hour   Intake 1850 ml   Output 1385 ml   Net 465 ml       Physical Exam:  General:  Sedated and on the ventilator. No acute distress. Eyes:  Sclera anicteric. Pupils equal, round, reactive to light. Mouth/Throat: Orotracheal tube in place. Orogastric tube in place. Neck: Supple. RIJ MAC/PAC in place. Lungs/Chest:   Bibasilar crackles, ventilator sounds. 3x mediastinal tubes. Midline dressing CDI. Cardiovascular:  RRR - Paced at 80, + rub. Abdomen:   Soft, non-tender, bowel sounds normal, non-distended. Extremities: No cyanosis or edema. Radial A line. Skin: No acute rash or lesions. Lymph Nodes: Cervical and supraclavicular normal.   Musculoskeletal:  No swelling or deformity. Lines/Devices:  Intact, no erythema, drainage, or tenderness. Psychiatric: Sedated and appears comfortable on ventilator. Labs & Data: Reviewed    Medications: Reviewed    Cath: 2v CAD    Multidisciplinary Rounds Completed:  No    ABCDEF Bundle/Checklist Completed: Yes    SPECIAL EQUIPMENT: PA Catheter and Temporary Pacemaker    DISPOSITION: Stay in ICU    CRITICAL CARE CONSULTANT NOTE  I had a face to face encounter with the patient, reviewed and interpreted patient data including clinical events, labs, images, vital signs, I/O's, and examined patient. I have discussed the case and the plan and management of the patient's care with the consulting services, the bedside nurses and the respiratory therapist.      I personally spent 45 minutes of critical care time. This is time spent at this critically ill patient's bedside actively involved in patient care as well as the coordination of care and discussions with the patient's family. This does not include any procedural time which has been billed separately.     Greg Cheng MD  Staff Intensivist/Anesthesiologist  [facility]  3/30/2021

## 2021-03-30 NOTE — OP NOTES
Coronary Artery Bypass Graft Procedure Note      Pre-operative Diagnosis:                                              Disease of the native coronary arteries                                              Unstable angina                                               Post-operative Diagnosis: same    Surgeon: Nicolás Tompkins MD    Assistant: ROSA Ovalles    Anesthesia: General endotracheal anesthesia    EBL:  See perfusion record    Specimen none    Implants none    Complications none        Operation: Coronary Bypass Grafting Procedure(s):  CABG X 3 WITH CPB: DONOR SITES:   LIMA to LAD  SVG to OM  SVG to PDA    AND RIGHT SAPHENOUS VEIN VIA EVH; YOJANA AND EPIAORTIC BY DR. Madhuri Maher    Operative Findings: At the time of the procedure there was moderate depression ov LV function . The coronary arteries were diffusely diseased The VICKI was good quality with good flow. The vein was of good quality. Indications:  See pre op diagnosis     Procedure Details     After informed consent was obtained for the above mentioned procedure, the patient was then taken to the operating room. Appropriate monitoring lines were placed by the Anesthesia Department. A complete surgical timeout was completed. The YOJANA report was reviewed with anesthesia. After administration of general endotracheal anesthesia, the patient was prepped and draped in the usual sterile fashion. The chest was then entered through a standard mediastinotomy incision while simultaneously harvesting of peripheral conduit was undertaken using endoscopic technique following a bolus of heparin. After harvesting the conduit, it was inspected and noted to be adequate. The epiaortic ultrasound was performed and reviewed. The left internal mammary harvested was then undertaken in pedicle distal pedicle was incised and noted to bleed briskly. VICKI was prepared with papaverine. The pericardium was then opened and tacked up into a pericardial well.   The patient was heparinized. Pursestring sutures were then placed into the aorta, the right atrial appendage. The patient was then cannulated through these pursestrings at which point, cardiopulmonary bypass was started. A retrograde coronary sinus canula was placed for cardioplegia administration. A cross-clamp was then applied. Cardioplegia was administered initially and then given intermittently throughout the case. Initial attention was directed at the circumflex vessels. SVG to the OM . Attention was then directed to the PDA. SVG to the PDA. The LIMA was used to graft the LAD. The proximal anastomosis of the vein graphs constructed to the ascending aorta. A warm dose of cardioplegia administered. The patient was weaned from bypass. Protamine was administered. The aortic cannula and venous cannula removed. The sites were reinforced. Ventricular and atrial pacing wires were then placed upon the heart, brought out through stab wounds inferiorly, and affixed to the skin. Mediastinal and L pleural chest tubes placed. . Hemostasis was assured, the sternum was reapproximated with heavy gauge stainless steel wire. The midline fascia was subsequently closed separately. Vicryl was then used in a running fashion for subcutaneous tissue and skin. Dressings were then applied to all wounds. The patient was then transported with monitors to the intensive care unit, intubated and ventilated. Physician Assistant  (PA) assistance was required due to the difficulty of the procedure. The PA assisted in the dissection of tissues, identification and exposure of pertinent anatomy including endoscopic harvest of the greater saphenous vein, and sternal closure in the operation to assure optimal patient outcome and safe conduct of the operation.                   Lila Robertson MD

## 2021-03-30 NOTE — PROGRESS NOTES
Cardiac Surgery Care Coordinator- Met with Jo Jade in pre-op holding. Reviewed role of the Cardiac Surgery Co- Nurse. Reviewed plan of care and discussed day of surgery expectations. Reviewed sternal precautions, pain scale and the importance of using the incentive spirometer after surgery. Encouraged Jo Jade to verbalize and emotional support given. Will continue to follow for educational and emotional needs. Will update family throughout surgery. 1000- Cardiac Surgery Care Coordinator- Met with the family of Jo Jade, introduced role of the Cardiac Surgery Co- Nurse. Reviewed plan of care and day of surgery expectations. Provided family with an update from OR. Encouraged family to verbalize and emotional support given. Will continue to update throughout the day. 1210- Met with the family of Jo Jade, provided update from the [address] [place] [address].      1345- Met with the family of Jo Jade, provided update from the OR and encouraged them to verbalize. 1600- Met with Eliza Joseph family and Dr. Caryl Triplett. Update given, Encouraged family to verbalize and offered emotional support. 1615- Escorted Pato the daughter of Jo Jade to the bedside, reviewed plan of care and encouraged her to verbalize. She will be going home for the evening.   Justyna Kumar RN

## 2021-03-30 NOTE — PERIOP NOTES
Patient stated her full name, date of birth and procedure in pre-op holding; Pt to OR via stretcher. With all wheels locked, pt transferred to OR table via slide board by anesthesia and OR staff X 3.   Right IJ [place] mathieu and Right radial arterial line placed in pre-op holding and intact.

## 2021-03-30 NOTE — ANESTHESIA PROCEDURE NOTES
Arterial Line Placement    Start time: 3/30/2021 11:09 AM  Performed by: Cash Espinoza MD  Authorized by: Cash Espinoza MD     Pre-Procedure  Indications:  Arterial pressure monitoring and blood sampling  Preanesthetic Checklist: patient identified, risks and benefits discussed, anesthesia consent, site marked, patient being monitored, timeout performed and patient being monitored    Timeout Time: 11:09        Procedure:   Prep:  ChloraPrep  Seldinger Technique?: Yes    Orientation:  Right  Location:  Radial artery  Catheter size:  20 G  Number of attempts:  1    Assessment:   Post-procedure:  Line secured and sterile dressing applied  Patient Tolerance:  Patient tolerated the procedure well with no immediate complications  Comment:   US guided

## 2021-03-30 NOTE — BRIEF OP NOTE
BRIEF OP NOTE  Pre-Op Diagnosis: CAD    Post-Op Diagnosis: CAD      Procedure: CABG X 3 (LIMA-LAD, SVG-OM, SVG-PDA)  RLE EVH    Surgeon: Anna    Assistant(s): ROSA Mejias    Anesthesia: General     Infusions: Precedex    Estimated Blood Loss: 710 mL    Cell Saver: 450 mL    Specimens: * No specimens in log *    Drains and pacing wires: 2 atrial wires, 1 bipolar ventricular wire, 3 julien drains    Complications: None    Findings: CAD    Implants: * No implants in log *    ROSA Mejias

## 2021-03-30 NOTE — PROGRESS NOTES
1940: Bedside shift change report given to Peggy (oncoming nurse) by Ruth (offgoing nurse). Report included the following information SBAR, Intake/Output, MAR, Accordion, Recent Results, Med Rec Status and Cardiac Rhythm SB/NSR.     2235: CHG bath & linen change    0750: Bedside shift change report given to Ruth (oncoming nurse) by Benny Hale (offgoing nurse).  Report included the following information SBAR, Intake/Output, MAR, Accordion, Recent Results, Med Rec Status and Cardiac Rhythm SB.

## 2021-03-31 ENCOUNTER — APPOINTMENT (OUTPATIENT)
Dept: GENERAL RADIOLOGY | Age: 66
DRG: 236 | End: 2021-03-31
Attending: NURSE PRACTITIONER
Payer: MEDICARE

## 2021-03-31 LAB
ADMINISTERED INITIALS, ADMINIT: NORMAL
ALBUMIN SERPL-MCNC: 4.4 G/DL (ref 3.5–5)
ALBUMIN/GLOB SERPL: 2.3 {RATIO} (ref 1.1–2.2)
ALP SERPL-CCNC: 52 U/L (ref 45–117)
ALT SERPL-CCNC: 53 U/L (ref 12–78)
ANION GAP SERPL CALC-SCNC: 7 MMOL/L (ref 5–15)
ARTERIAL PATENCY WRIST A: ABNORMAL
AST SERPL-CCNC: 71 U/L (ref 15–37)
ATRIAL RATE: 69 BPM
BASE DEFICIT BLD-SCNC: 3 MMOL/L
BDY SITE: ABNORMAL
BILIRUB SERPL-MCNC: 0.6 MG/DL (ref 0.2–1)
BUN SERPL-MCNC: 18 MG/DL (ref 6–20)
BUN/CREAT SERPL: 21 (ref 12–20)
CA-I BLD-SCNC: 1.22 MMOL/L (ref 1.12–1.32)
CALCIUM SERPL-MCNC: 8.1 MG/DL (ref 8.5–10.1)
CALCULATED P AXIS, ECG09: 43 DEGREES
CALCULATED R AXIS, ECG10: -14 DEGREES
CALCULATED T AXIS, ECG11: -16 DEGREES
CARB RATIO, CHOR: 15
CARBOHYDRATE EATEN, CHO: 30 G
CHLORIDE SERPL-SCNC: 113 MMOL/L (ref 97–108)
CO2 SERPL-SCNC: 23 MMOL/L (ref 21–32)
CREAT SERPL-MCNC: 0.85 MG/DL (ref 0.55–1.02)
D50 ADMINISTERED, D50ADM: 0 ML
D50 ORDER, D50ORD: 0 ML
DIAGNOSIS, 93000: NORMAL
ERYTHROCYTE [DISTWIDTH] IN BLOOD BY AUTOMATED COUNT: 13.2 % (ref 11.5–14.5)
GAS FLOW.O2 O2 DELIVERY SYS: ABNORMAL L/MIN
GAS FLOW.O2 SETTING OXYMISER: 6 L/M
GLOBULIN SER CALC-MCNC: 1.9 G/DL (ref 2–4)
GLSCOM COMMENTS: NORMAL
GLUCOSE BLD STRIP.AUTO-MCNC: 103 MG/DL (ref 65–100)
GLUCOSE BLD STRIP.AUTO-MCNC: 105 MG/DL (ref 65–100)
GLUCOSE BLD STRIP.AUTO-MCNC: 105 MG/DL (ref 65–100)
GLUCOSE BLD STRIP.AUTO-MCNC: 112 MG/DL (ref 65–100)
GLUCOSE BLD STRIP.AUTO-MCNC: 117 MG/DL (ref 65–100)
GLUCOSE BLD STRIP.AUTO-MCNC: 121 MG/DL (ref 65–100)
GLUCOSE BLD STRIP.AUTO-MCNC: 122 MG/DL (ref 65–100)
GLUCOSE BLD STRIP.AUTO-MCNC: 125 MG/DL (ref 65–100)
GLUCOSE BLD STRIP.AUTO-MCNC: 130 MG/DL (ref 65–100)
GLUCOSE BLD STRIP.AUTO-MCNC: 132 MG/DL (ref 65–100)
GLUCOSE BLD STRIP.AUTO-MCNC: 133 MG/DL (ref 65–100)
GLUCOSE BLD STRIP.AUTO-MCNC: 134 MG/DL (ref 65–100)
GLUCOSE BLD STRIP.AUTO-MCNC: 139 MG/DL (ref 65–100)
GLUCOSE BLD STRIP.AUTO-MCNC: 89 MG/DL (ref 65–100)
GLUCOSE BLD STRIP.AUTO-MCNC: 89 MG/DL (ref 65–100)
GLUCOSE BLD STRIP.AUTO-MCNC: 96 MG/DL (ref 65–100)
GLUCOSE BLD STRIP.AUTO-MCNC: 98 MG/DL (ref 65–100)
GLUCOSE SERPL-MCNC: 92 MG/DL (ref 65–100)
GLUCOSE, GLC: 103 MG/DL
GLUCOSE, GLC: 105 MG/DL
GLUCOSE, GLC: 105 MG/DL
GLUCOSE, GLC: 112 MG/DL
GLUCOSE, GLC: 117 MG/DL
GLUCOSE, GLC: 121 MG/DL
GLUCOSE, GLC: 122 MG/DL
GLUCOSE, GLC: 125 MG/DL
GLUCOSE, GLC: 130 MG/DL
GLUCOSE, GLC: 132 MG/DL
GLUCOSE, GLC: 133 MG/DL
GLUCOSE, GLC: 139 MG/DL
GLUCOSE, GLC: 89 MG/DL
GLUCOSE, GLC: 89 MG/DL
GLUCOSE, GLC: 96 MG/DL
GLUCOSE, GLC: 98 MG/DL
HCO3 BLD-SCNC: 22.8 MMOL/L (ref 22–26)
HCT VFR BLD AUTO: 29 % (ref 35–47)
HGB BLD-MCNC: 9.5 G/DL (ref 11.5–16)
HIGH TARGET, HITG: 130 MG/DL
INSULIN ADMINSTERED, INSADM: 0.3 UNITS/HOUR
INSULIN ADMINSTERED, INSADM: 0.5 UNITS/HOUR
INSULIN ADMINSTERED, INSADM: 0.5 UNITS/HOUR
INSULIN ADMINSTERED, INSADM: 0.6 UNITS/HOUR
INSULIN ADMINSTERED, INSADM: 0.7 UNITS/HOUR
INSULIN ADMINSTERED, INSADM: 1 UNITS/HOUR
INSULIN ADMINSTERED, INSADM: 1.1 UNITS/HOUR
INSULIN ADMINSTERED, INSADM: 1.2 UNITS/HOUR
INSULIN ADMINSTERED, INSADM: 1.2 UNITS/HOUR
INSULIN ADMINSTERED, INSADM: 1.3 UNITS/HOUR
INSULIN ADMINSTERED, INSADM: 1.4 UNITS/HOUR
INSULIN ADMINSTERED, INSADM: 1.4 UNITS/HOUR
INSULIN ADMINSTERED, INSADM: 2 UNITS/HOUR
INSULIN ADMINSTERED, INSADM: 2.2 UNITS/HOUR
INSULIN BOLUS ADMINISTERED, INSBOLADM: 2 UNITS/HOUR
INSULIN BOLUS ORDERED, INSBOLORD: 2 UNITS/HOUR
INSULIN ORDER, INSORD: 0.3 UNITS/HOUR
INSULIN ORDER, INSORD: 0.5 UNITS/HOUR
INSULIN ORDER, INSORD: 0.5 UNITS/HOUR
INSULIN ORDER, INSORD: 0.6 UNITS/HOUR
INSULIN ORDER, INSORD: 0.7 UNITS/HOUR
INSULIN ORDER, INSORD: 1 UNITS/HOUR
INSULIN ORDER, INSORD: 1.1 UNITS/HOUR
INSULIN ORDER, INSORD: 1.2 UNITS/HOUR
INSULIN ORDER, INSORD: 1.2 UNITS/HOUR
INSULIN ORDER, INSORD: 1.3 UNITS/HOUR
INSULIN ORDER, INSORD: 1.4 UNITS/HOUR
INSULIN ORDER, INSORD: 1.4 UNITS/HOUR
INSULIN ORDER, INSORD: 2 UNITS/HOUR
INSULIN ORDER, INSORD: 2.2 UNITS/HOUR
LOW TARGET, LOT: 95 MG/DL
MAGNESIUM SERPL-MCNC: 2.4 MG/DL (ref 1.6–2.4)
MCH RBC QN AUTO: 29.1 PG (ref 26–34)
MCHC RBC AUTO-ENTMCNC: 32.8 G/DL (ref 30–36.5)
MCV RBC AUTO: 88.7 FL (ref 80–99)
MINUTES UNTIL NEXT BG, NBG: 120 MIN
MINUTES UNTIL NEXT BG, NBG: 60 MIN
MULTIPLIER, MUL: 0.01
MULTIPLIER, MUL: 0.02
MULTIPLIER, MUL: 0.03
NRBC # BLD: 0 K/UL (ref 0–0.01)
NRBC BLD-RTO: 0 PER 100 WBC
ORDER INITIALS, ORDINIT: NORMAL
P-R INTERVAL, ECG05: 178 MS
PCO2 BLD: 43.3 MMHG (ref 35–45)
PH BLD: 7.33 [PH] (ref 7.35–7.45)
PLATELET # BLD AUTO: 149 K/UL (ref 150–400)
PMV BLD AUTO: 9.9 FL (ref 8.9–12.9)
PO2 BLD: 37 MMHG (ref 80–100)
POTASSIUM SERPL-SCNC: 4.2 MMOL/L (ref 3.5–5.1)
PROT SERPL-MCNC: 6.3 G/DL (ref 6.4–8.2)
Q-T INTERVAL, ECG07: 404 MS
QRS DURATION, ECG06: 88 MS
QTC CALCULATION (BEZET), ECG08: 432 MS
RBC # BLD AUTO: 3.27 M/UL (ref 3.8–5.2)
SAO2 % BLD: 66 % (ref 92–97)
SERVICE CMNT-IMP: ABNORMAL
SERVICE CMNT-IMP: NORMAL
SODIUM SERPL-SCNC: 143 MMOL/L (ref 136–145)
SPECIMEN TYPE: ABNORMAL
TOTAL RESP. RATE, ITRR: 22
VENTRICULAR RATE, ECG03: 69 BPM
WBC # BLD AUTO: 14.3 K/UL (ref 3.6–11)

## 2021-03-31 PROCEDURE — P9045 ALBUMIN (HUMAN), 5%, 250 ML: HCPCS | Performed by: NURSE PRACTITIONER

## 2021-03-31 PROCEDURE — 36415 COLL VENOUS BLD VENIPUNCTURE: CPT

## 2021-03-31 PROCEDURE — 2709999900 HC NON-CHARGEABLE SUPPLY

## 2021-03-31 PROCEDURE — 85027 COMPLETE CBC AUTOMATED: CPT

## 2021-03-31 PROCEDURE — 74011250636 HC RX REV CODE- 250/636: Performed by: NURSE PRACTITIONER

## 2021-03-31 PROCEDURE — 97530 THERAPEUTIC ACTIVITIES: CPT

## 2021-03-31 PROCEDURE — 97165 OT EVAL LOW COMPLEX 30 MIN: CPT

## 2021-03-31 PROCEDURE — 82803 BLOOD GASES ANY COMBINATION: CPT

## 2021-03-31 PROCEDURE — 65620000000 HC RM CCU GENERAL

## 2021-03-31 PROCEDURE — 74011250637 HC RX REV CODE- 250/637: Performed by: NURSE PRACTITIONER

## 2021-03-31 PROCEDURE — 97161 PT EVAL LOW COMPLEX 20 MIN: CPT

## 2021-03-31 PROCEDURE — 71045 X-RAY EXAM CHEST 1 VIEW: CPT

## 2021-03-31 PROCEDURE — 80053 COMPREHEN METABOLIC PANEL: CPT

## 2021-03-31 PROCEDURE — 74011636637 HC RX REV CODE- 636/637: Performed by: NURSE PRACTITIONER

## 2021-03-31 PROCEDURE — 74011000250 HC RX REV CODE- 250: Performed by: NURSE PRACTITIONER

## 2021-03-31 PROCEDURE — 93005 ELECTROCARDIOGRAM TRACING: CPT

## 2021-03-31 PROCEDURE — 82962 GLUCOSE BLOOD TEST: CPT

## 2021-03-31 PROCEDURE — 97535 SELF CARE MNGMENT TRAINING: CPT

## 2021-03-31 PROCEDURE — 74011000258 HC RX REV CODE- 258: Performed by: NURSE PRACTITIONER

## 2021-03-31 PROCEDURE — 77010033678 HC OXYGEN DAILY

## 2021-03-31 PROCEDURE — 83735 ASSAY OF MAGNESIUM: CPT

## 2021-03-31 PROCEDURE — 99231 SBSQ HOSP IP/OBS SF/LOW 25: CPT | Performed by: CLINICAL NURSE SPECIALIST

## 2021-03-31 PROCEDURE — 74011250637 HC RX REV CODE- 250/637: Performed by: THORACIC SURGERY (CARDIOTHORACIC VASCULAR SURGERY)

## 2021-03-31 RX ORDER — ALBUMIN HUMAN 50 G/1000ML
12.5 SOLUTION INTRAVENOUS ONCE
Status: COMPLETED | OUTPATIENT
Start: 2021-03-31 | End: 2021-03-31

## 2021-03-31 RX ADMIN — INSULIN LISPRO 2 UNITS: 100 INJECTION, SOLUTION INTRAVENOUS; SUBCUTANEOUS at 09:14

## 2021-03-31 RX ADMIN — ATORVASTATIN CALCIUM 40 MG: 40 TABLET, FILM COATED ORAL at 09:00

## 2021-03-31 RX ADMIN — ACETAMINOPHEN 650 MG: 325 TABLET ORAL at 12:45

## 2021-03-31 RX ADMIN — ACETAMINOPHEN 650 MG: 325 TABLET ORAL at 09:00

## 2021-03-31 RX ADMIN — MUPIROCIN: 20 OINTMENT TOPICAL at 17:31

## 2021-03-31 RX ADMIN — DOCUSATE SODIUM 50 MG AND SENNOSIDES 8.6 MG 1 TABLET: 8.6; 5 TABLET, FILM COATED ORAL at 17:28

## 2021-03-31 RX ADMIN — ALBUMIN (HUMAN) 12.5 G: 12.5 INJECTION, SOLUTION INTRAVENOUS at 09:01

## 2021-03-31 RX ADMIN — MAGNESIUM GLUCONATE 500 MG ORAL TABLET 400 MG: 500 TABLET ORAL at 17:28

## 2021-03-31 RX ADMIN — OXYCODONE 5 MG: 5 TABLET ORAL at 20:52

## 2021-03-31 RX ADMIN — SODIUM CHLORIDE 3 UNITS/HR: 9 INJECTION, SOLUTION INTRAVENOUS at 23:05

## 2021-03-31 RX ADMIN — MORPHINE SULFATE 2 MG: 2 INJECTION, SOLUTION INTRAMUSCULAR; INTRAVENOUS at 18:15

## 2021-03-31 RX ADMIN — MORPHINE SULFATE 2 MG: 2 INJECTION, SOLUTION INTRAMUSCULAR; INTRAVENOUS at 07:17

## 2021-03-31 RX ADMIN — OXYCODONE 10 MG: 5 TABLET ORAL at 17:29

## 2021-03-31 RX ADMIN — ACETAMINOPHEN ORAL SOLUTION 650 MG: 650 SOLUTION ORAL at 17:30

## 2021-03-31 RX ADMIN — CEFAZOLIN SODIUM 2 G: 1 INJECTION, POWDER, FOR SOLUTION INTRAMUSCULAR; INTRAVENOUS at 06:04

## 2021-03-31 RX ADMIN — FAMOTIDINE 20 MG: 10 INJECTION, SOLUTION INTRAVENOUS at 09:00

## 2021-03-31 RX ADMIN — MUPIROCIN: 20 OINTMENT TOPICAL at 09:15

## 2021-03-31 RX ADMIN — ASPIRIN 81 MG: 81 TABLET, CHEWABLE ORAL at 09:00

## 2021-03-31 RX ADMIN — CEFAZOLIN SODIUM 2 G: 1 INJECTION, POWDER, FOR SOLUTION INTRAMUSCULAR; INTRAVENOUS at 12:50

## 2021-03-31 RX ADMIN — CHLORHEXIDINE GLUCONATE 10 ML: 1.2 RINSE ORAL at 09:02

## 2021-03-31 RX ADMIN — OXYCODONE 5 MG: 5 TABLET ORAL at 09:26

## 2021-03-31 RX ADMIN — CEFAZOLIN SODIUM 2 G: 1 INJECTION, POWDER, FOR SOLUTION INTRAMUSCULAR; INTRAVENOUS at 17:39

## 2021-03-31 RX ADMIN — POLYETHYLENE GLYCOL 3350 17 G: 17 POWDER, FOR SOLUTION ORAL at 09:00

## 2021-03-31 RX ADMIN — Medication 10 ML: at 22:00

## 2021-03-31 RX ADMIN — OXYCODONE 5 MG: 5 TABLET ORAL at 06:03

## 2021-03-31 RX ADMIN — FAMOTIDINE 20 MG: 20 TABLET, FILM COATED ORAL at 20:52

## 2021-03-31 RX ADMIN — ONDANSETRON 4 MG: 2 INJECTION INTRAMUSCULAR; INTRAVENOUS at 18:03

## 2021-03-31 RX ADMIN — DOCUSATE SODIUM 50 MG AND SENNOSIDES 8.6 MG 1 TABLET: 8.6; 5 TABLET, FILM COATED ORAL at 09:00

## 2021-03-31 RX ADMIN — CHLORHEXIDINE GLUCONATE 10 ML: 1.2 RINSE ORAL at 21:00

## 2021-03-31 RX ADMIN — ACETAMINOPHEN 650 MG: 325 TABLET ORAL at 06:03

## 2021-03-31 RX ADMIN — CEFAZOLIN SODIUM 2 G: 1 INJECTION, POWDER, FOR SOLUTION INTRAMUSCULAR; INTRAVENOUS at 00:18

## 2021-03-31 RX ADMIN — Medication 10 ML: at 07:14

## 2021-03-31 RX ADMIN — FAMOTIDINE 20 MG: 20 TABLET, FILM COATED ORAL at 06:03

## 2021-03-31 NOTE — DIABETES MGMT
[address] [facility]    CLINICAL NURSE SPECIALIST CONSULT     Initial Presentation   Shanna White is a 76 y.o. female who was admitted 3/25/21 after an abnormal cardiac cath which revealed severe multivessel CAD. She transferred from [facility] to [facility] for CABG. HX:   Past Medical History:   Diagnosis Date    Hyperlipidemia     Murmur     diagnosed in high school    Pyloric stenosis     as infant - s/p surg        DX: Severe Multivessel CAD    TX: CABG x3    Hospital course   Clinical progress has been complicated. Diabetes    Patient does not have a history of diabetes    Family history negative for diabetes. Admission  and A1c 5.2%     Consulted by Provider for advanced diabetes nursing assessment and care, specifically related to   [x] Transitioning off Glucostabilizer     Subjective   She relocated from [place], four weeks ago. Recent    She is a retired  with [employer]. Pt is living with her brother & sister-in-law in [place]. POD 1 from CABG x3  On NC  Pacing  A1C 5.2  Pre-op glucose   Poor po intake      Objective   Physical exam  General Alert, oriented and in no acute pain. Conversant and cooperative. Vital Signs   Visit Vitals  /65   Pulse 83   Temp 99.3 °F (37.4 °C)   Resp 25   Ht 5' 4\" (1.626 m)   Wt 72.8 kg (160 lb 9.6 oz)   SpO2 93%   BMI 27.57 kg/m²     Skin  Warm and dry. No acanthosis noted along neckline. Heart   Regular rate and rhythm.  No murmurs, rubs or gallops  Lungs  Clear to auscultation without rales or rhonchi  Extremities No foot wounds         Laboratory      CBC W/O DIFF    Collection Time: 03/31/21  3:07 AM   Result Value Ref Range    WBC 14.3 (H) 3.6 - 11.0 K/uL    RBC 3.27 (L) 3.80 - 5.20 M/uL    HGB 9.5 (L) 11.5 - 16.0 g/dL    HCT 29.0 (L) 35.0 - 47.0 %    MCV 88.7 80.0 - 99.0 FL    MCH 29.1 26.0 - 34.0 PG    MCHC 32.8 30.0 - 36.5 g/dL    RDW 13.2 11.5 - 14.5 %    PLATELET 425 (L) 943 - 400 K/uL    MPV 9.9 8.9 - 12.9 FL    NRBC 0.0 0  WBC    ABSOLUTE NRBC 0.00 0.00 - 0.01 K/uL         METABOLIC PANEL, COMPREHENSIVE    Collection Time: 03/31/21  3:07 AM   Result Value Ref Range    Sodium 143 136 - 145 mmol/L    Potassium 4.2 3.5 - 5.1 mmol/L    Chloride 113 (H) 97 - 108 mmol/L    CO2 23 21 - 32 mmol/L    Anion gap 7 5 - 15 mmol/L    Glucose 92 65 - 100 mg/dL    BUN 18 6 - 20 MG/DL    Creatinine 0.85 0.55 - 1.02 MG/DL    BUN/Creatinine ratio 21 (H) 12 - 20      GFR est AA >60 >60 ml/min/1.73m2    GFR est non-AA >60 >60 ml/min/1.73m2    Calcium 8.1 (L) 8.5 - 10.1 MG/DL    Bilirubin, total 0.6 0.2 - 1.0 MG/DL    ALT (SGPT) 53 12 - 78 U/L    AST (SGOT) 71 (H) 15 - 37 U/L    Alk. phosphatase 52 45 - 117 U/L    Protein, total 6.3 (L) 6.4 - 8.2 g/dL    Albumin 4.4 3.5 - 5.0 g/dL    Globulin 1.9 (L) 2.0 - 4.0 g/dL    A-G Ratio 2.3 (H) 1.1 - 2.2       Blood glucose pattern        Assessment and Plan   Nursing Diagnosis Risk for unstable blood glucose pattern   Nursing Intervention Domain 5250 Decision-making Support   Nursing Interventions Examined current inpatient diabetes control   Explored factors facilitating and impeding inpatient management  Identified self-management practices impeding diabetes control  Explored corrective strategies with patient and responsible inpatient provider   Informed patient of rational for insulin strategy while hospitalized     Evaluation   Adi Ny is a 76year old female with no history of diabetes admitted with multivessel CAD now s/p CABGx3. Admitting A1C 5.2% and glucose 117. Blood glucose now well controlled on insulin infusion via post-operative protocol. There is low suspicion of hyperglycemia once transitioning from the insulin infusion. Recommendations   1. Insulin infusion per post-operative period    2. Lantus 2 hrs prior to d/c infusion when transitioning off    3. Blood glucose monitoring ACHS once off insulin infusion.   If glucose within goal, ok to trend on am labs and d/c correctional insulin. Billing Code(s)   [x] 13542     Before making these care recommendations, I personally reviewed the hosptialization record, including laboratory and diagnostic data, medications and examined the patient at bedside (circumstances permitting).   Total minutes: 21    WILLEM Soliman  Diabetes Clinical Nurse Specialist  [facility]  Access via [employer]

## 2021-03-31 NOTE — PROGRESS NOTES
Problem: Self Care Deficits Care Plan (Adult)  Goal: *Acute Goals and Plan of Care (Insert Text)  Description: FUNCTIONAL STATUS PRIOR TO ADMISSION: Patient was independent and active without use of DME. Patient recently retired from [employer] about 2 months ago. Patient is from [place], [place] however is now living in [place] with her brother/sister-in-law after the losses of her mother, daughter, and . HOME SUPPORT: Patient will be staying with her brother who can assist her as needed. Occupational Therapy Goals  Initiated 3/31/2021  1. Patient will perform ADLs standing 5 mins without fatigue or LOB with supervision/set-up within 5 day(s). 2.  Patient will perform lower body ADLs with supervision/set-up within 5 day(s). 3.  Patient will perform gathering ADL items high and low 2/2 with supervision/set-up within 5 day(s). 4.  Patient will perform toilet transfers with supervision/set-up within 5 day(s). 5.  Patient will perform all aspects of toileting with supervision/set-up within 5 day(s). 6.  Patient will participate in cardiac/sternal upper extremity therapeutic exercise/activities to increase independence with ADLs with supervision/set-up for 5 minutes within 5 day(s). Outcome: Progressing Towards Goal   OCCUPATIONAL THERAPY EVALUATION  Patient: Mikki Amaya (72 y.o. female)  Date: 3/31/2021  Primary Diagnosis: CAD (coronary artery disease) [I25.10]  Procedure(s) (LRB):  CABG X 3 WITH CPB: DONOR SITES: LIMA AND RIGHT SAPHENOUS VEIN VIA EVH; YOJANA AND EPIAORTIC BY DR. Fazal Avalos (N/A) 1 Day Post-Op   Precautions:  Fall(\"move in the tube\")    ASSESSMENT  Based on the objective data described below, the patient presents with generalized weakness, decreased endurance, decreased activity tolerance, and with drowsiness this session s/p CABG x 3 POD 1.  PTA, patient had recently moved to [place] after the loss of several family members and will be staying with her brother and sister-in-law at discharge. Patient did not previously require assistance for ADLs and IADLs and recently retired. Patient limited by fatigue this session however anticipate ability to return home with HHOT vs no needs post discharge. Patient is verbalizing and is demonstrating understanding of mindful-based movements (\"move in the tube\") principles of keeping UEs proximal to ribcage to prevent lateral pull on the sternum during load-bearing activities with verbal cues required for compliance. Current Level of Function Impacting Discharge (ADLs/self-care): largely MIN A (limited by benson and chest tubes)    Functional Outcome Measure: The patient scored 40/100 on the Barthel Index outcome measure which is indicative of 60% ADL impairment. Other factors to consider for discharge: O2 needs     Patient will benefit from skilled therapy intervention to address the above noted impairments. PLAN :  Recommendations and Planned Interventions: self care training, functional mobility training, therapeutic exercise, balance training, therapeutic activities, endurance activities, patient education, home safety training and family training/education    Frequency/Duration: Patient will be followed by occupational therapy 5 times a week to address goals. Recommendation for discharge: (in order for the patient to meet his/her long term goals)  Occupational therapy at least 2 days/week in the home vs no needs    This discharge recommendation:  Has not yet been discussed the attending provider and/or case management    IF patient discharges home will need the following DME: none       SUBJECTIVE:   Patient stated I just feel so tired. 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     Past Surgical History:   Procedure Laterality Date    HX OPEN CHOLECYSTECTOMY  1989       Expanded or extensive additional review of patient history:     Home Situation  Home Environment: Private residence  # Steps to Enter: 0  One/Two Story Residence: One story(steps to game room but patient does not go there)  Living Alone: No  Support Systems: Family member(s)(living w/ brother and sister in law)  Patient Expects to be Discharged to[de-identified] Private residence(brother and sister-in-laws residence)  Current DME Used/Available at Home: Grab bars, Raised toilet seat  Tub or Shower Type: Shower      EXAMINATION OF PERFORMANCE DEFICITS:  Cognitive/Behavioral Status:  Neurologic State: Drowsy  Orientation Level: Oriented X4  Cognition: Appropriate for age attention/concentration  Perception: Appears intact  Perseveration: No perseveration noted  Safety/Judgement: Decreased insight into deficits    Skin: chest tubes, benson cath, temporary pacer, Rochelle-mathieu, arterial line,IV lines and leads    Edema: slightly BLE    Hearing: Auditory  Auditory Impairment: None    Vision/Perceptual:                                Corrective Lenses: Glasses    Range of Motion:  BUE  AROM: Generally decreased, functional  PROM: Generally decreased, functional                      Strength:  BUE  Strength: Generally decreased, functional                Coordination:  Coordination: Generally decreased, functional  Fine Motor Skills-Upper: Left Intact; Right Intact    Gross Motor Skills-Upper: Left Intact; Right Intact    Tone & Sensation:  BUE  Tone: Normal  Sensation: Intact                      Balance:  Sitting: Impaired; Without support  Sitting - Static: Good (unsupported)  Sitting - Dynamic: Fair (occasional)  Standing: Impaired; Without support  Standing - Static: Fair  Standing - Dynamic : Fair    Functional Mobility and Transfers for ADLs:  Bed Mobility:  Sit to Supine:  Moderate assistance;Assist x1;Additional time    Transfers:  Sit to Stand: Contact guard assistance;Assist x1;Additional time  Stand to Sit: Contact guard assistance;Assist x1;Additional time  Bed to Chair: Contact guard assistance;Assist x1;Additional time(chair > bed)    ADL Assessment:  Feeding: Setup;Supervision(infer 2* lines)    Oral Facial Hygiene/Grooming: Contact guard assistance(infer 2* standing balance)    Bathing: Minimum assistance(infer A for BLE)    Upper Body Dressing: Setup;Supervision(infer cues for bimanual overhead reaching)    Lower Body Dressing: Minimum assistance(infer 2* chest tube pain with tailor sit)    Toileting: Moderate assistance(benson cath; infer A for pericare with BM)                ADL Intervention and task modifications:                           Lower Body Dressing Assistance  Socks: Minimum assistance         Cognitive Retraining  Safety/Judgement: Decreased insight into deficits    Patient instructed and educated on mindful movement principles based on Move in The Tube concept to include maintaining bilateral elbows close to rib cage when performing any load-bearing activity such as getting in/out of bed, pushing up from a chair, opening a door, or lifting a box. Patient was given a handout with diagrams of each correct/incorrect method of performing each of the above tasks. Patient instructed on the ability to utilize upper extremities outside the tube when doing any non-load bearing activity such as washing hair/body, brushing teeth, retrieving clothing items, or scratching your back. Patient encouraged to also perform upper extremity exercises \"outside of the tube\" to prevent scar tissue formation around sternal incision site. Patient instructed no asymmetrical reaching over head to ensure B UEs when shoulders >90* i.e. reaching in cabinets and dressing. May have to adjust home setup to increase ease with items closer to waist height to prevent deep bending and the automatic  of asymmetrical UE WB/pushing for stabilization during bending.   Benefit to don clothing tailor sitting and don all clothing while sitting prior to standing. Patient demonstrated lower body dressing with Minimum assistance. Therapeutic Exercises:   Patient instructed on the benefits and demonstrated 5/6 cardiac exercises while seated with Supervision. CARDIAC   EXERCISE    Sets    Reps    Active  Active Assist    Passive  Self ROM    Comments    Shoulder flexion  1  5   [x]                            []                             []                             []                                Shoulder abduction  1  5  [x]                             []                             []                             []                                Scapular elevation  1  5  [x]                             []                              []                             []                                Scapular retraction  1  5  [x]                             []                             []                             []                                Trunk rotation  1  5  [x]                             []                             []                             []                                Trunk sidebending             Functional Measure:  Barthel Index:    Bathin  Bladder: 0  Bowels: 10  Groomin  Dressin  Feedin  Mobility: 0  Stairs: 0  Toilet Use: 5  Transfer (Bed to Chair and Back): 10  Total: 40/100        The Barthel ADL Index: Guidelines  1. The index should be used as a record of what a patient does, not as a record of what a patient could do. 2. The main aim is to establish degree of independence from any help, physical or verbal, however minor and for whatever reason. 3. The need for supervision renders the patient not independent. 4. A patient's performance should be established using the best available evidence. Asking the patient, friends/relatives and nurses are the usual sources, but direct observation and common sense are also important. However direct testing is not needed.   5. Usually the patient's performance over the preceding 24-48 hours is important, but occasionally longer periods will be relevant. 6. Middle categories imply that the patient supplies over 50 per cent of the effort. 7. Use of aids to be independent is allowed. Mennie Barthel., Barthel, D.W. (8489). Functional evaluation: the Barthel Index. [facility] (14)2. JOVANY Mcarthur, Keely Waite, Aniya Stockton, [place], [facility] (). Measuring the change indisability after inpatient rehabilitation; comparison of the responsiveness of the Barthel Index and Functional [place] Measure. Journal of Neurology, Neurosurgery, and Psychiatry, 66(4), 666-476. Arabella Garg, N.J.A, GEOVANI Dalal, & Mercedes Krishnan MDEBORAH. (2004.) Assessment of post-stroke quality of life in cost-effectiveness studies: The usefulness of the Barthel Index and the EuroQoL-5D. Quality of Life Research, 15, 519-85        Occupational Therapy Evaluation Charge Determination   History Examination Decision-Making   LOW Complexity : Brief history review  MEDIUM Complexity : 3-5 performance deficits relating to physical, cognitive , or psychosocial skils that result in activity limitations and / or participation restrictions LOW Complexity : No comorbidities that affect functional and no verbal or physical assistance needed to complete eval tasks       Based on the above components, the patient evaluation is determined to be of the following complexity level: LOW   Pain Ratin/10 near chest tube site (FACES scale)    Activity Tolerance:   Fair and requires rest breaks    After treatment patient left in no apparent distress:    Supine in bed and Call bell within reach    COMMUNICATION/EDUCATION:   The patients plan of care was discussed with: Physical therapist and Registered nurse. Home safety education was provided and the patient/caregiver indicated understanding., Patient/family have participated as able in goal setting and plan of care. , and Patient/family agree to work toward stated goals and plan of care. This patients plan of care is appropriate for delegation to [facility].     Thank you for this referral.  Tania Covarrubias  Time Calculation: 21 mins

## 2021-03-31 NOTE — PROGRESS NOTES
0800-Bedside and Verbal shift change report given to ODILIA Wagoner and ODILIA Varma (oncoming nurse) by Jaspreet Boss (offgoing nurse). Report included the following information SBAR, Kardex, Procedure Summary, Intake/Output, MAR, Accordion, Recent Results, Cardiac Rhythm NSR and Quality Measures.

## 2021-03-31 NOTE — PROGRESS NOTES
2000: Bedside and Verbal shift change report given to [employer] (oncoming nurse) by Cathleen Olvear RN (offgoing nurse). Report included the following information SBAR, Kardex, OR Summary, Procedure Summary, Intake/Output, MAR, Recent Results, Cardiac Rhythm Paced and Alarm Parameters . Patient awake ad alert in bed, respiratory called to come put patient on CPAP mode at this time. 2047: Respiratory at bedside. Patient placed on CPAP mode. 2130: Respiratory at bedside. ABG drawn. 7.32/43.7/120/-4/22.4/98. Dr. Isac Kenney notified. Okay to extubate per MD.     2135: Patient extubated to 6L NC. Tolerated well. No stridor noted. 2240: Patient passed stand. 0340: Patient's pacer paused for EKG. Rhythm NSR, HR 60's-70's.     0545: Respiratory at bedside. Mixed venous drawn. Result 66. Monitor re-calibrated. 0745: Laddie Nyhan NP at bedside. Updated on plan of care. 0800: Bedside and Verbal shift change report given to Avani HARTLEY and Dora Benítez (oncoming nurse) by Cathleen Olvera RN (offgoing nurse). Report included the following information SBAR, Kardex, OR Summary, Procedure Summary, Intake/Output, MAR, Recent Results, Cardiac Rhythm Paced and Alarm Parameters .

## 2021-03-31 NOTE — PROGRESS NOTES
Transitions of Care: 19% risk of re-admission      -TBD, anticipate discharge home when medically stable with home health, [facility] has accepted and following     The patient is post-op day 1 s/p CABG, PT/OT evaluations pending. The CM reviewed previous CM notes- the patient lives with her brother and sister-in-law prior to admission, independent at baseline. [facility] has accepted and following for anticipated home health needs. The CM will follow for transitions of care, PT/OT when appropriate.      YULY Garcia

## 2021-03-31 NOTE — PROGRESS NOTES
[facility] ICU Progress Note    Admit Date: 3/26/2021  POD:  1 Day Post-Op    Procedure:  Procedure(s):  CABG X 3 WITH CPB: DONOR SITES: LIMA AND RIGHT SAPHENOUS VEIN VIA EVH; YOJANA AND EPIAORTIC BY DR. Samuel Christensen        Subjective:   Pt seen with Dr. Gurmeet Graves. Tmax 99.1, Oxygen at 6L per NC. Tawanda at 10. Up in the chair. Feels rough. Objective:   Vitals:  Blood pressure 105/66, pulse 88, temperature 99 °F (37.2 °C), resp. rate 21, height 5' 4\" (1.626 m), weight 160 lb 9.6 oz (72.8 kg), SpO2 99 %. Temp (24hrs), Av.8 °F (36.6 °C), Min:97 °F (36.1 °C), Max:99.1 °F (37.3 °C)    Hemodynamics:   CO: CO (l/min): 5.4 l/min   CI: CI (l/min/m2): 3 l/min/m2   CVP: CVP (mmHg): 15 mmHg (21 07)   SVR: SVR (dyne*sec)/cm5: 1244 (dyne*sec)/cm5 (21 9447)   PAP Systolic: PAP Systolic: 37 (61/98/76 7299)   PAP Diastolic: PAP Diastolic: 18 (28/29/34 4839)   PVR:     SV02: SVO2 (%): 66 % (21 0700)   SCV02:      EKG/Rhythm:  Paced at 88 with underlying SR in the 60-70s    Extubation Date / Time: 3/30/21 at 2135    CT Output: 205 ml + 175 ml (last 24 hours)    Oxygen Therapy:  Oxygen Therapy  O2 Sat (%): 99 % (21 0700)  Pulse via Oximetry: 50 beats per minute (21 1041)  O2 Device: Nasal cannula (21 040)  O2 Flow Rate (L/min): 6 l/min (21 0400)  FIO2 (%): 50 % (21)    CXR:  CXR Results  (Last 48 hours)               21 1636  XR CHEST PORT Final result    Impression:  There is slight discoid atelectasis lung bases. There is no   pneumothorax. Narrative:  EXAM:  XR CHEST PORT       INDICATION:  postop heart       COMPARISON:  3/26/2071       FINDINGS: A portable AP radiograph of the chest was obtained at 1752 hours. ET   tube is in satisfactory position. [place] catheter overlies right pulmonary   artery. Left chest tubes and mediastinal drains are noted in position. NG tube   appears to overlie the stomach. .  Lungs are clear except for minor bibasilar atelectasis. There is no pneumothorax. Vicky Macario Heart size is upper limits of normal. .                       Admission Weight: Last Weight   Weight: 162 lb 7.7 oz (73.7 kg) Weight: 160 lb 9.6 oz (72.8 kg)     Intake / Output / Drain:  Current Shift: No intake/output data recorded. Last 24 hrs.:     Intake/Output Summary (Last 24 hours) at 3/31/2021 0801  Last data filed at 3/31/2021 0700  Gross per 24 hour   Intake 2568.9 ml   Output 2708 ml   Net -139.1 ml       EXAM:  General:  No acute distress   Lungs:   Decreased aeration in the bases, +rub   Incision:  Dressing clean, dry, and intact   Heart:  Regular rate and rhythm, S1, S2 normal, no murmur, click, rub or gallop. Abdomen:   Soft, non-tender. Bowel sounds hypoactive. No masses,  No organomegaly. Extremities:  No edema. PPP. Neurologic:  Gross motor and sensory apparatus intact. Labs:   Recent Labs     21  0707 21  0307 21  0307 21  1617 21  1617   WBC  --   --  14.3*  --  19.0*   HGB  --   --  9.5*   < > 11.3*   HCT  --   --  29.0*   < > 33.2*   PLT  --   --  149*  --  165   NA  --   --  143   < > 136   K  --   --  4.2   < > 4.6   BUN  --   --  18   < > 15   CREA  --   --  0.85   < > 0.87   GLU  --   --  92   < > 120*   GLUCPOC 89   < >  --    < > 125*   INR  --   --   --   --  1.3*    < > = values in this interval not displayed. Assessment:     Active Problems:    CAD (coronary artery disease) (3/25/2021)      HLD (hyperlipidemia) (3/26/2021)      S/P CABG x 3 (3/30/2021)         Plan/Recommendations/Medical Decision Makin. CAD 2VD s/p CABG with Dr. Beverly Chimera 3/30. Currently requiring Tawanda. On ASA, Statin. Add BB when appropriate. Currently pacing due to hypotension. Will give albumin and wean of Tawanda as able. 2. Acute systolic dysfunction, NYHA class II: LVEF 45%. Will start GDTM postop as appropriate. 3. HLD: continue lipitor     4. Atelectasis:  IS and TCDB. Increase activity.  Wean oxygen to keep sat >92%. 5. Anemia: Postoperative secondary to acute blood loss. Currently above transfusion threshold. Continue Iron. Monitor daily CBC. 6. Thrombocytopenia: Due to CABG/pump run. Continue ASA for now. Continue to monitor.     Dispo: PT/OT/Cardiac Rehab. Case Management for discharge planning.       Signed By: Alyx Tapia NP

## 2021-03-31 NOTE — PROGRESS NOTES
Problem: Mobility Impaired (Adult and Pediatric)  Goal: *Acute Goals and Plan of Care (Insert Text)  Description: FUNCTIONAL STATUS PRIOR TO ADMISSION: Patient was independent and active without use of DME. Patient recently retired from [employer] about 2 months ago. Patient is from [place], [place] however is now living in [facility] with her brother/sister-in-law after the losses of her mother, daughter, and . HOME SUPPORT: Patient will be staying with her brother who can assist her as needed. Physical Therapy Goals  Initiated 3/31/2021  1. Patient will move from supine to sit and sit to supine , scoot up and down, and roll side to side in bed with modified independence within 5 days. 2.  Patient will perform sit to/from stand with modified independence within 5 days. 3.  Patient will ambulate 300 feet with least restrictive assistive device and independence within 5 days. 4.  Patient will ascend/descend 2 stairs with  handrail(s) with modified independence within 5 days. 5.  Patient will perform cardiac exercises per protocol with independence within 5 days. 6.  Patient will verbally recall and functionally demonstrate mindful-based movements (\"move in the tube\") principles without cues within 5 days. Outcome: Not Met     PHYSICAL THERAPY EVALUATION  Patient: Janette Hathaway (42 y.o. female)  Date: 3/31/2021  Primary Diagnosis: CAD (coronary artery disease) [I25.10]  Procedure(s) (LRB):  CABG X 3 WITH CPB: DONOR SITES: LIMA AND RIGHT SAPHENOUS VEIN VIA EVH; YOJANA AND EPIAORTIC BY DR. Merlinda Rosales (N/A) 1 Day Post-Op   Precautions: Fall(\"move in the tube\")      ASSESSMENT  Based on the objective data described below, the patient presents with impaired functional independence compared to baseline s/p CABG x3, POD 1.   Currently, functional mobility is limited by newly enforced sternal precautions (minful-based movements), impaired cardiopulmonary tolerance (SvO2 46 when first up w/ rapid return to 58-60), generally decreased strength and endurance, post op pain. Reviewed mindful-based movement principles for load-bearing tasks. Patient verbalized and demonstrated understanding with intermittent cues for compliance. Also reviewed use of chest splinting while coughing, role of acute PT and typical mobility progression, benefit of frequent mobilization outside of therapy sessions with nursing staff. Received patient in bed with HOB elevated. She is able to transition to sitting EOB with min assist and take a few steps to the chair with assist primarily for line management. Unable to ambulate functional distances today d/t lines (swan mathieu, arterial). Patient voiced no light headedness with upright and manageable pain. Vitals: 97/60 (MAP 73), RR 20, HR 78, SvO2 60 post activity (sitting up in chair). Anticipate steady functional gains once lines are discharged and return home with HHPT vs transition to cardiac rehab. Current Level of Function Impacting Discharge (mobility/balance): Min assist supine to sit, CGA sit<->stand, min assist bed to chair    Functional Outcome Measure: The patient scored 40/100 on the Barthel outcome measure which is indicative of 60% impairment in functional mobility and tasks. Other factors to consider for discharge: New sternotomy, PLOF indep, will have support of her brother      Patient will benefit from skilled therapy intervention to address the above noted impairments. PLAN :  Recommendations and Planned Interventions: bed mobility training, transfer training, gait training, therapeutic exercises, patient and family training/education, and therapeutic activities      Frequency/Duration: Patient will be followed by physical therapy:  daily to address goals.     Recommendation for discharge: (in order for the patient to meet his/her long term goals)  To be determined: HHPT vs transitioning to cardiac rehab    This discharge recommendation:  Has not yet been discussed the attending provider and/or case management    IF patient discharges home will need the following DME: none anticipated         SUBJECTIVE:   Patient stated I read the book.     OBJECTIVE DATA SUMMARY:   Patient mobilized on continuous portable monitor/telemetry. HISTORY:    Past Medical History:   Diagnosis Date    Hyperlipidemia     Murmur     diagnosed in high school    Pyloric stenosis     as infant - s/p surg     Past Surgical History:   Procedure Laterality Date    HX OPEN CHOLECYSTECTOMY  1989       Personal factors and/or comorbidities impacting plan of care:     Home Situation  Home Environment: Private residence  # Steps to Enter: 0  One/Two Story Residence: One story(steps to game room but patient does not go there)  Living Alone: No  Support Systems: Family member(s)(living w/ brother and sister in law)  Patient Expects to be Discharged to[de-identified] Private residence(brother and sister-in-laws residence)  Current DME Used/Available at Home: Grab bars, Raised toilet seat  Tub or Shower Type: Shower    EXAMINATION/PRESENTATION/DECISION MAKING:     Critical Behavior:  Neurologic State: Drowsy  Orientation Level: Oriented X4  Cognition: Appropriate for age attention/concentration  Safety/Judgement: Decreased insight into deficits  Hearing: Auditory  Auditory Impairment: None  Skin:  Sternal incision covered with dry dressing, pacer, arterial line, swan mathieu, IV  Edema: none noted  Range Of Motion:  AROM: Generally decreased, functional    Strength:    Strength: Generally decreased, functional       Tone & Sensation:   Tone: Normal  Sensation: Intact    Coordination:  Coordination: Generally decreased, functional  Vision:   Corrective Lenses: Glasses  Functional Mobility:  Bed Mobility:  Sit to Supine: n/t* received supine in bed  Supine to Sit: Minimum assistance;Bed Modified;Assist x1  Sit to Supine:  Moderate assistance;Assist x1;Additional time  Scooting: Stand-by assistance  Transfers:  Sit to Stand: Contact guard assistance;Assist x1  Stand to Sit: Contact guard assistance;Assist x1  Bed to Chair: Contact guard assistance;Assist x1(w/ assist of another for lines)  Balance:   Sitting: Intact; Without support  Sitting - Static: Good (unsupported)  Sitting - Dynamic: Fair (occasional)  Standing: Impaired; Without support  Standing - Static: Good  Standing - Dynamic : Fair  Ambulation/Gait Training:  Limited by lines (swan mathieu, arterial). Able to take a few steps from bed to chair with CGA, steady gait. Cardiac diagnosis intervention:  Patient instructed and educated on mindful movement principles based on Move in The Tube concept to include maintaining bilateral elbows close to rib cage when performing any load-bearing activity such as getting in/out of bed, pushing up from a chair, opening a door, or lifting a box. Patient was given a handout with diagrams of each correct/incorrect method of performing each of the above tasks. Therapeutic Exercises:         Functional Measure:  Barthel Index:    Bathin  Bladder: 0  Bowels: 10  Groomin  Dressin  Feedin  Mobility: 0  Stairs: 0  Toilet Use: 5  Transfer (Bed to Chair and Back): 10  Total: 40/100       The Barthel ADL Index: Guidelines  1. The index should be used as a record of what a patient does, not as a record of what a patient could do. 2. The main aim is to establish degree of independence from any help, physical or verbal, however minor and for whatever reason. 3. The need for supervision renders the patient not independent. 4. A patient's performance should be established using the best available evidence. Asking the patient, friends/relatives and nurses are the usual sources, but direct observation and common sense are also important. However direct testing is not needed.   5. Usually the patient's performance over the preceding 24-48 hours is important, but occasionally longer periods will be relevant. 6. Middle categories imply that the patient supplies over 50 per cent of the effort. 7. Use of aids to be independent is allowed. Avel Knowles., Barthel, DDavidW. (8596). Functional evaluation: the Barthel Index. [facility] (14)2. JOVANY Gorman, Opal Bella.Chad., [place], [address] (1999). Measuring the change indisability after inpatient rehabilitation; comparison of the responsiveness of the Barthel Index and Functional Bremer Measure. Journal of Neurology, Neurosurgery, and Psychiatry, 66(4), 164-419. Anahy Friend, NDavidJ.A, GEOVANI Dalal, & Kali Walker M.A. (2004.) Assessment of post-stroke quality of life in cost-effectiveness studies: The usefulness of the Barthel Index and the EuroQoL-5D. Quality of Life Research, 15, 164-13            Physical Therapy Evaluation Charge Determination   History Examination Presentation Decision-Making   LOW Complexity : Zero comorbidities / personal factors that will impact the outcome / POC LOW Complexity : 1-2 Standardized tests and measures addressing body structure, function, activity limitation and / or participation in recreation  LOW Complexity : Stable, uncomplicated  Other outcome measures Barthel 40/100        Based on the above components, the patient evaluation is determined to be of the following complexity level: LOW     Pain Rating:  None voiced    Activity Tolerance:   Fair and requires rest breaks, O2    After treatment patient left in no apparent distress:   Sitting in chair and Call bell within reach    COMMUNICATION/EDUCATION:   The patients plan of care was discussed with: Registered nurse. Fall prevention education was provided and the patient/caregiver indicated understanding., Patient/family have participated as able in goal setting and plan of care. , and Patient/family agree to work toward stated goals and plan of care.     Thank you for this referral.  Christy Banks, PT   Time Calculation: 28 mins

## 2021-03-31 NOTE — ROUTINE PROCESS
10:30 Patient transferred from [facility] as a lateral transfer, she is alert and oriented, daughter at bedside for visit. Patient states pain much less nad tolerable after Geetha. She remains oan small amount of Phenylephrine and insulin gtt. 13:30 She is not hungry, did not eat lunch, says throat just a little sore, \"I'm just so tired today\". 13:45  Vargas removed 14:00 Up to chair with PT, tolerated moving well. 
 
17:29 Roxicodone for pain 18:00 Having difficulty getting meds down, she does not say she is nauseated but taking meds makes her gag 18:03 She vomited approximately 300 cc bile colored emesis, Zofran given. 18:15 Morphine for abd/chest pain at chest tube site. 19:30 Bedside shift change report given to Kyung (oncoming nurse) by MOM (offgoing nurse). Report included the following information SBAR, Kardex, Procedure Summary, Intake/Output, MAR, Accordion, Recent Results, Med Rec Status, Cardiac Rhythm NSR, Alarm Parameters , Pre Procedure Checklist, Procedure Verification and Quality Measures.

## 2021-04-01 ENCOUNTER — APPOINTMENT (OUTPATIENT)
Dept: GENERAL RADIOLOGY | Age: 66
DRG: 236 | End: 2021-04-01
Attending: NURSE PRACTITIONER
Payer: MEDICARE

## 2021-04-01 LAB
ADMINISTERED INITIALS, ADMINIT: NORMAL
ALBUMIN SERPL-MCNC: 4 G/DL (ref 3.5–5)
ALBUMIN/GLOB SERPL: 1.7 {RATIO} (ref 1.1–2.2)
ALP SERPL-CCNC: 56 U/L (ref 45–117)
ALT SERPL-CCNC: 27 U/L (ref 12–78)
ANION GAP SERPL CALC-SCNC: 6 MMOL/L (ref 5–15)
AST SERPL-CCNC: 38 U/L (ref 15–37)
BILIRUB SERPL-MCNC: 0.6 MG/DL (ref 0.2–1)
BUN SERPL-MCNC: 22 MG/DL (ref 6–20)
BUN/CREAT SERPL: 27 (ref 12–20)
CALCIUM SERPL-MCNC: 8.5 MG/DL (ref 8.5–10.1)
CHLORIDE SERPL-SCNC: 109 MMOL/L (ref 97–108)
CO2 SERPL-SCNC: 24 MMOL/L (ref 21–32)
CREAT SERPL-MCNC: 0.83 MG/DL (ref 0.55–1.02)
D50 ADMINISTERED, D50ADM: 0 ML
D50 ORDER, D50ORD: 0 ML
ERYTHROCYTE [DISTWIDTH] IN BLOOD BY AUTOMATED COUNT: 13.4 % (ref 11.5–14.5)
GLOBULIN SER CALC-MCNC: 2.3 G/DL (ref 2–4)
GLSCOM COMMENTS: NORMAL
GLUCOSE BLD STRIP.AUTO-MCNC: 108 MG/DL (ref 65–100)
GLUCOSE BLD STRIP.AUTO-MCNC: 108 MG/DL (ref 65–100)
GLUCOSE BLD STRIP.AUTO-MCNC: 109 MG/DL (ref 65–100)
GLUCOSE BLD STRIP.AUTO-MCNC: 109 MG/DL (ref 65–100)
GLUCOSE BLD STRIP.AUTO-MCNC: 113 MG/DL (ref 65–100)
GLUCOSE BLD STRIP.AUTO-MCNC: 128 MG/DL (ref 65–100)
GLUCOSE BLD STRIP.AUTO-MCNC: 83 MG/DL (ref 65–100)
GLUCOSE BLD STRIP.AUTO-MCNC: 96 MG/DL (ref 65–100)
GLUCOSE BLD STRIP.AUTO-MCNC: 96 MG/DL (ref 65–100)
GLUCOSE BLD STRIP.AUTO-MCNC: 99 MG/DL (ref 65–100)
GLUCOSE SERPL-MCNC: 80 MG/DL (ref 65–100)
GLUCOSE, GLC: 108 MG/DL
GLUCOSE, GLC: 108 MG/DL
GLUCOSE, GLC: 109 MG/DL
GLUCOSE, GLC: 109 MG/DL
GLUCOSE, GLC: 113 MG/DL
GLUCOSE, GLC: 83 MG/DL
GLUCOSE, GLC: 96 MG/DL
GLUCOSE, GLC: 96 MG/DL
HCT VFR BLD AUTO: 28.1 % (ref 35–47)
HGB BLD-MCNC: 8.9 G/DL (ref 11.5–16)
HIGH TARGET, HITG: 130 MG/DL
INSULIN ADMINSTERED, INSADM: 0.5 UNITS/HOUR
INSULIN ADMINSTERED, INSADM: 0.7 UNITS/HOUR
INSULIN ADMINSTERED, INSADM: 1 UNITS/HOUR
INSULIN ADMINSTERED, INSADM: 1 UNITS/HOUR
INSULIN ADMINSTERED, INSADM: 1.1 UNITS/HOUR
INSULIN ADMINSTERED, INSADM: 1.1 UNITS/HOUR
INSULIN ADMINSTERED, INSADM: 1.4 UNITS/HOUR
INSULIN ADMINSTERED, INSADM: 1.5 UNITS/HOUR
INSULIN ORDER, INSORD: 0.5 UNITS/HOUR
INSULIN ORDER, INSORD: 0.7 UNITS/HOUR
INSULIN ORDER, INSORD: 1 UNITS/HOUR
INSULIN ORDER, INSORD: 1 UNITS/HOUR
INSULIN ORDER, INSORD: 1.1 UNITS/HOUR
INSULIN ORDER, INSORD: 1.1 UNITS/HOUR
INSULIN ORDER, INSORD: 1.4 UNITS/HOUR
INSULIN ORDER, INSORD: 1.5 UNITS/HOUR
LOW TARGET, LOT: 95 MG/DL
MAGNESIUM SERPL-MCNC: 2.5 MG/DL (ref 1.6–2.4)
MCH RBC QN AUTO: 29.5 PG (ref 26–34)
MCHC RBC AUTO-ENTMCNC: 31.7 G/DL (ref 30–36.5)
MCV RBC AUTO: 93 FL (ref 80–99)
MINUTES UNTIL NEXT BG, NBG: 120 MIN
MINUTES UNTIL NEXT BG, NBG: 60 MIN
MULTIPLIER, MUL: 0.02
MULTIPLIER, MUL: 0.03
NRBC # BLD: 0 K/UL (ref 0–0.01)
NRBC BLD-RTO: 0 PER 100 WBC
ORDER INITIALS, ORDINIT: NORMAL
PLATELET # BLD AUTO: 116 K/UL (ref 150–400)
PMV BLD AUTO: 10.2 FL (ref 8.9–12.9)
POTASSIUM SERPL-SCNC: 3.7 MMOL/L (ref 3.5–5.1)
PROT SERPL-MCNC: 6.3 G/DL (ref 6.4–8.2)
RBC # BLD AUTO: 3.02 M/UL (ref 3.8–5.2)
SERVICE CMNT-IMP: ABNORMAL
SERVICE CMNT-IMP: NORMAL
SODIUM SERPL-SCNC: 139 MMOL/L (ref 136–145)
WBC # BLD AUTO: 12.6 K/UL (ref 3.6–11)

## 2021-04-01 PROCEDURE — 97530 THERAPEUTIC ACTIVITIES: CPT

## 2021-04-01 PROCEDURE — 94760 N-INVAS EAR/PLS OXIMETRY 1: CPT

## 2021-04-01 PROCEDURE — 85027 COMPLETE CBC AUTOMATED: CPT

## 2021-04-01 PROCEDURE — 74011250636 HC RX REV CODE- 250/636: Performed by: NURSE PRACTITIONER

## 2021-04-01 PROCEDURE — 71045 X-RAY EXAM CHEST 1 VIEW: CPT

## 2021-04-01 PROCEDURE — 82962 GLUCOSE BLOOD TEST: CPT

## 2021-04-01 PROCEDURE — 74011250637 HC RX REV CODE- 250/637: Performed by: NURSE PRACTITIONER

## 2021-04-01 PROCEDURE — 77010033678 HC OXYGEN DAILY

## 2021-04-01 PROCEDURE — 80053 COMPREHEN METABOLIC PANEL: CPT

## 2021-04-01 PROCEDURE — 83735 ASSAY OF MAGNESIUM: CPT

## 2021-04-01 PROCEDURE — 65660000001 HC RM ICU INTERMED STEPDOWN

## 2021-04-01 PROCEDURE — 74011000250 HC RX REV CODE- 250

## 2021-04-01 PROCEDURE — 74011000250 HC RX REV CODE- 250: Performed by: NURSE PRACTITIONER

## 2021-04-01 PROCEDURE — 99231 SBSQ HOSP IP/OBS SF/LOW 25: CPT | Performed by: CLINICAL NURSE SPECIALIST

## 2021-04-01 PROCEDURE — 97116 GAIT TRAINING THERAPY: CPT

## 2021-04-01 PROCEDURE — 36415 COLL VENOUS BLD VENIPUNCTURE: CPT

## 2021-04-01 PROCEDURE — 97535 SELF CARE MNGMENT TRAINING: CPT

## 2021-04-01 RX ORDER — PANTOPRAZOLE SODIUM 40 MG/1
40 TABLET, DELAYED RELEASE ORAL
Status: DISCONTINUED | OUTPATIENT
Start: 2021-04-02 | End: 2021-04-02

## 2021-04-01 RX ORDER — METOPROLOL TARTRATE 25 MG/1
12.5 TABLET, FILM COATED ORAL EVERY 12 HOURS
Status: DISCONTINUED | OUTPATIENT
Start: 2021-04-01 | End: 2021-04-05 | Stop reason: HOSPADM

## 2021-04-01 RX ORDER — BACITRACIN 500 UNIT/G
PACKET (EA) TOPICAL
Status: COMPLETED
Start: 2021-04-01 | End: 2021-04-01

## 2021-04-01 RX ORDER — METOCLOPRAMIDE HYDROCHLORIDE 5 MG/ML
10 INJECTION INTRAMUSCULAR; INTRAVENOUS EVERY 6 HOURS
Status: COMPLETED | OUTPATIENT
Start: 2021-04-01 | End: 2021-04-02

## 2021-04-01 RX ADMIN — CEFAZOLIN SODIUM 2 G: 1 INJECTION, POWDER, FOR SOLUTION INTRAMUSCULAR; INTRAVENOUS at 00:53

## 2021-04-01 RX ADMIN — AMIODARONE HYDROCHLORIDE 400 MG: 200 TABLET ORAL at 20:17

## 2021-04-01 RX ADMIN — MUPIROCIN: 20 OINTMENT TOPICAL at 17:18

## 2021-04-01 RX ADMIN — CHLORHEXIDINE GLUCONATE 10 ML: 1.2 RINSE ORAL at 09:19

## 2021-04-01 RX ADMIN — MUPIROCIN: 20 OINTMENT TOPICAL at 09:19

## 2021-04-01 RX ADMIN — OXYCODONE 5 MG: 5 TABLET ORAL at 14:17

## 2021-04-01 RX ADMIN — ASPIRIN 81 MG: 81 TABLET, CHEWABLE ORAL at 09:15

## 2021-04-01 RX ADMIN — Medication 10 ML: at 14:17

## 2021-04-01 RX ADMIN — OXYCODONE 5 MG: 5 TABLET ORAL at 20:22

## 2021-04-01 RX ADMIN — METOCLOPRAMIDE 10 MG: 5 INJECTION, SOLUTION INTRAMUSCULAR; INTRAVENOUS at 17:18

## 2021-04-01 RX ADMIN — BACITRACIN: 500 OINTMENT TOPICAL at 05:00

## 2021-04-01 RX ADMIN — POTASSIUM BICARBONATE 20 MEQ: 782 TABLET, EFFERVESCENT ORAL at 11:18

## 2021-04-01 RX ADMIN — ATORVASTATIN CALCIUM 40 MG: 40 TABLET, FILM COATED ORAL at 09:15

## 2021-04-01 RX ADMIN — MAGNESIUM GLUCONATE 500 MG ORAL TABLET 400 MG: 500 TABLET ORAL at 09:17

## 2021-04-01 RX ADMIN — AMIODARONE HYDROCHLORIDE 400 MG: 200 TABLET ORAL at 08:54

## 2021-04-01 RX ADMIN — METOCLOPRAMIDE 10 MG: 5 INJECTION, SOLUTION INTRAMUSCULAR; INTRAVENOUS at 11:18

## 2021-04-01 RX ADMIN — CHLORHEXIDINE GLUCONATE 10 ML: 1.2 RINSE ORAL at 21:00

## 2021-04-01 RX ADMIN — OXYCODONE 10 MG: 5 TABLET ORAL at 04:22

## 2021-04-01 RX ADMIN — MAGNESIUM GLUCONATE 500 MG ORAL TABLET 400 MG: 500 TABLET ORAL at 17:18

## 2021-04-01 RX ADMIN — POLYETHYLENE GLYCOL 3350 17 G: 17 POWDER, FOR SOLUTION ORAL at 08:54

## 2021-04-01 RX ADMIN — FAMOTIDINE 20 MG: 20 TABLET, FILM COATED ORAL at 08:54

## 2021-04-01 RX ADMIN — METOPROLOL TARTRATE 12.5 MG: 25 TABLET, FILM COATED ORAL at 20:17

## 2021-04-01 NOTE — PROGRESS NOTES
1930: Bedside shift report received from [facility]. 2100: Removed PA catheter per protocol. Pt tolerated well.    0300: Pt has had 0 urine output post benson removal, bladder scanned -300.    0345: Pt up to bedside commode voided 290 cc of urine. 0400:  MAC catheter pulled. AM labs drawn. 0700: Pt up to chair. 0730: Bedside and Verbal shift change report given to [facility] (oncoming nurse) by Malorie Amos (offgoing nurse). Report included the following information SBAR, Kardex, Intake/Output, MAR and Cardiac Rhythm nsr.

## 2021-04-01 NOTE — DIABETES MGMT
[address] [place] [address]    CLINICAL NURSE SPECIALIST CONSULT     Initial Presentation   Juan Johnson is a 76 y.o. female who was admitted 3/25/21 after an abnormal cardiac cath which revealed severe multivessel CAD. She transferred from [facility] to [address] [place] [address] for CABG. HX:   Past Medical History:   Diagnosis Date    Hyperlipidemia     Murmur     diagnosed in high school    Pyloric stenosis     as infant - s/p surg        DX: Severe Multivessel CAD    TX: CABG x3    Hospital course   Clinical progress has been complicated. Diabetes    Patient does not have a history of diabetes    Family history negative for diabetes. Admission  and A1c 5.2%     Consulted by Provider for advanced diabetes nursing assessment and care, specifically related to   [x] Transitioning off Glucostabilizer     Subjective   She relocated from [place], four weeks ago. Recent    She is a retired  with [employer]. Pt is living with her brother & sister-in-law in [place]. POD 1 from CABG x3  On NC  Pacing  A1C 5.2  Poor po intake     3/30: total 8.6 units on insulin infusion  3/31: total 25 units on insulin infusion  4/1: 10.5 units via infusion- now infusion turned off    Fasting glucose today 109  24 hour range on insulin drip      Objective   Physical exam  General Alert, oriented and in no acute pain. Conversant and cooperative. Vital Signs   Visit Vitals  /65   Pulse 91   Temp 98.2 °F (36.8 °C)   Resp 17   Ht 5' 4\" (1.626 m)   Wt 73.1 kg (161 lb 3.2 oz)   SpO2 92%   BMI 27.67 kg/m²     Skin  Warm and dry. No acanthosis noted along neckline. Heart   Regular rate and rhythm.  No murmurs, rubs or gallops  Lungs  Clear to auscultation without rales or rhonchi  Extremities No foot wounds         Laboratory      CBC W/O DIFF    Collection Time: 04/01/21  4:15 AM   Result Value Ref Range    WBC 12.6 (H) 3.6 - 11.0 K/uL    RBC 3.02 (L) 3.80 - 5.20 M/uL    HGB 8.9 (L) 11.5 - 16.0 g/dL    HCT 28.1 (L) 35.0 - 47.0 %    MCV 93.0 80.0 - 99.0 FL    MCH 29.5 26.0 - 34.0 PG    MCHC 31.7 30.0 - 36.5 g/dL    RDW 13.4 11.5 - 14.5 %    PLATELET 478 (L) 224 - 400 K/uL    MPV 10.2 8.9 - 12.9 FL    NRBC 0.0 0  WBC    ABSOLUTE NRBC 0.00 0.00 - 0.01 K/uL         METABOLIC PANEL, COMPREHENSIVE    Collection Time: 04/01/21  4:15 AM   Result Value Ref Range    Sodium 139 136 - 145 mmol/L    Potassium 3.7 3.5 - 5.1 mmol/L    Chloride 109 (H) 97 - 108 mmol/L    CO2 24 21 - 32 mmol/L    Anion gap 6 5 - 15 mmol/L    Glucose 80 65 - 100 mg/dL    BUN 22 (H) 6 - 20 MG/DL    Creatinine 0.83 0.55 - 1.02 MG/DL    BUN/Creatinine ratio 27 (H) 12 - 20      GFR est AA >60 >60 ml/min/1.73m2    GFR est non-AA >60 >60 ml/min/1.73m2    Calcium 8.5 8.5 - 10.1 MG/DL    Bilirubin, total 0.6 0.2 - 1.0 MG/DL    ALT (SGPT) 27 12 - 78 U/L    AST (SGOT) 38 (H) 15 - 37 U/L    Alk. phosphatase 56 45 - 117 U/L    Protein, total 6.3 (L) 6.4 - 8.2 g/dL    Albumin 4.0 3.5 - 5.0 g/dL    Globulin 2.3 2.0 - 4.0 g/dL    A-G Ratio 1.7 1.1 - 2.2       Blood glucose pattern        Assessment and Plan   Nursing Diagnosis Risk for unstable blood glucose pattern   Nursing Intervention Domain 5252 Decision-making Support   Nursing Interventions Examined current inpatient diabetes control   Explored factors facilitating and impeding inpatient management  Identified self-management practices impeding diabetes control  Explored corrective strategies with patient and responsible inpatient provider   Informed patient of rational for insulin strategy while hospitalized     Evaluation   Marquis Meza is a 76year old female with no history of diabetes admitted with multivessel CAD now s/p CABGx3. Admitting A1C 5.2% and glucose 117. Blood glucose now well controlled on insulin infusion via post-operative protocol. There is low suspicion of hyperglycemia once transitioning from the insulin infusion.   Insulin drip d/c'd, monitor blood glucose ACHS for one day and trend blood glucose levels. Recommendations   1. Blood glucose monitoring ACHS once off insulin infusion. If glucose within goal, ok to trend on am labs and d/c correctional insulin. Billing Code(s)   [x] 13794     Before making these care recommendations, I personally reviewed the hosptialization record, including laboratory and diagnostic data, medications and examined the patient at bedside (circumstances permitting).   Total minutes: Tamika Arriaga Nurse Specialist Student  [facility]  Access via [employer]

## 2021-04-01 NOTE — PROGRESS NOTES
1000: Maria Esther Cole NP at bedside, updated on pt status and plans to transfer to CCU    1030: TRANSFER - OUT REPORT:    Verbal report given to Ruben Jenkins RN (name) on Bruno Thompson  being transferred to CCU (unit) for routine progression of care /bed availability      Report consisted of patients Situation, Background, Assessment and   Recommendations(SBAR). Information from the following report(s) SBAR, Intake/Output, MAR, Recent Results, Cardiac Rhythm NSR and Alarm Parameters  was reviewed with the receiving nurse. Opportunity for questions and clarification was provided.       Patient transported with:   Monitor  O2 @ 6 liters  Patient-specific medications from Pharmacy  Registered Nurse  Tech    I have reviewed and agree with Lee Lanza RN charting and assessment

## 2021-04-01 NOTE — PROGRESS NOTES
Cardiac Surgery Care Coordinator-  Met with Flori Cano. Reviewed plan of care and discussed goals for the day. Flori Cano has a good understanding of her plan for the day. Reinforced sternal precautions and encouraged continued use of the incentive spirometer. Discussed possible discharge date and encouraged Flori Cano and her daughter to verbalize. Will continue to follow for educational and emotional needs.  Lexi Dillard RN

## 2021-04-01 NOTE — PROGRESS NOTES
Cardiac Surgery Care Coordinator- Met with Gerry Arnold and her sister in law,  reviewed plan of care and discussed potential discharge date. Reinforced sternal precautions and encouraged continued use of the incentive spirometer. Reviewed goals for the day and emphasized the importance of increased activity to meet discharge goals. Provided Gerry Arnold with red reminder bracelet. Reviewed purpose of the bracelet and when to call MD. Will continue to follow for educational and emotional needs.  Hussein Nolasco RN

## 2021-04-01 NOTE — ROUTINE PROCESS
08:00 SBAR from [place] 09:00 Having difficulty taking meds, they make her gag, she will take them one at a time throughout this am. States she is exhausted and has been up since 4am.  
 
09:45 Back to bed, chest tubes removed by NP. Pacer wire care completed. 11:45 Attempted to wean oxygen to room air, o2 sat's dropped to 88%, encouraged IS.  
 
12:30 Walked in halls with PT, tolerated well. Still has poor appetite,  
 
15:15 Attempted to wean oxygen, o2 sat's at 88%, continued to encourage IS 
 
16:05 Up walking in halls of CCU, tolerated well, denies complaints. 19:30 Bedside shift change report given to Kyung (oncoming nurse) by MOM (offgoing nurse). Report included the following information SBAR, Kardex, Procedure Summary, Intake/Output, MAR, Accordion, Recent Results, Med Rec Status, Cardiac Rhythm NSR/ST, Alarm Parameters , Pre Procedure Checklist, Procedure Verification and Quality Measures.

## 2021-04-01 NOTE — PROGRESS NOTES
Problem: Self Care Deficits Care Plan (Adult)  Goal: *Acute Goals and Plan of Care (Insert Text)  Description: FUNCTIONAL STATUS PRIOR TO ADMISSION: Patient was independent and active without use of DME. Patient recently retired from [employer] about 2 months ago. Patient is from [place], [place] however is now living in [place] with her brother/sister-in-law after the losses of her mother, daughter, and . HOME SUPPORT: Patient will be staying with her brother who can assist her as needed. Occupational Therapy Goals  Initiated 3/31/2021  1. Patient will perform ADLs standing 5 mins without fatigue or LOB with supervision/set-up within 5 day(s). 2.  Patient will perform lower body ADLs with supervision/set-up within 5 day(s). 3.  Patient will perform gathering ADL items high and low 2/2 with supervision/set-up within 5 day(s). 4.  Patient will perform toilet transfers with supervision/set-up within 5 day(s). 5.  Patient will perform all aspects of toileting with supervision/set-up within 5 day(s). 6.  Patient will participate in cardiac/sternal upper extremity therapeutic exercise/activities to increase independence with ADLs with supervision/set-up for 5 minutes within 5 day(s). Outcome: Progressing Towards Goal  OCCUPATIONAL THERAPY TREATMENT  Patient: Gerry Arnold (79 y.o. female)  Date: 4/1/2021  Diagnosis: CAD (coronary artery disease) [I25.10] <principal problem not specified>  Procedure(s) (LRB):  CABG X 3 WITH CPB: DONOR SITES: LIMA AND RIGHT SAPHENOUS VEIN VIA EVH; YOJANA AND EPIAORTIC BY DR. Rose Sanz (N/A) 2 Days Post-Op  Precautions: Fall(\"move in the tube\")  Chart, occupational therapy assessment, plan of care, and goals were reviewed. ASSESSMENT  Patient continues with skilled OT services and is progressing towards goals. Education completed.       Current Level of Function Impacting Discharge (ADLs): min assistance lower body ADLs, education completed and has assistance at home    Other factors to consider for discharge: has ROSEMARY and Brother PRN         PLAN :  Discharging OT    Recommend with staff: continued OOB to chair, bathroom and completing ADLs as patient able      Recommendation for discharge: (in order for the patient to meet his/her long term goals)  No skilled occupational therapy/ follow up rehabilitation needs identified at this time. This discharge recommendation:  Has not yet been discussed the attending provider and/or case management    IF patient discharges home will need the following DME: none       SUBJECTIVE:   Patient stated My ROSEMARY is on top of all of it and very helpful.     OBJECTIVE DATA SUMMARY:   Cognitive/Behavioral Status:  Neurologic State: Alert  Orientation Level: Oriented X4  Cognition: Appropriate decision making; Appropriate for age attention/concentration; Appropriate safety awareness             Functional Mobility and Transfers for ADLs:  Bed Mobility:       Transfers:  Sit to Stand: Supervision          Balance:  Sitting: Intact; Without support  Standing: Intact; Without support    ADL Intervention:  Feeding  Feeding Assistance: Independent    Grooming  Grooming Assistance: Modified independent                   Lower Body Dressing Assistance  Underpants: Minimum assistance(per PT while standing)  Socks: Minimum assistance  Leg Crossed Method Used: Yes  Position Performed: Seated in chair  Instruction on benefits sitting and tailor sitting, patient then demonstrated doff socks no assistance, assistance don over toes only. Toileting  Bladder Hygiene: Supervision  Bowel Hygiene: Supervision  Clothing Management: Supervision; instruction can use wet wipes if difficulty reaching. Attempted 3 times. Patient completing most ADLs with nurse, then some PT and then during this session.    Patient instructed and indicated understanding the benefits of maintaining activity tolerance, functional mobility, and independence with self care tasks during acute stay  to ensure safe return home and to baseline. Encouraged patient to increase frequency and duration OOB, be out of bed for all meals, perform daily ADLs (as approved by RN/MD regarding bathing etc), and performing functional mobility to/from bathroom. Instruction on benefits of pacing in prep for basic and instrumental ADLs. Patient recalled s/s of too much work and how to apply for safety to continue progressing independence. Especially in prep for standing to shower. Not to have water pressure directly on chest and when wound is healed/per RN/MD clearance. Instruction on benefits raised height ADL items so not have unilateral WB nor SOB. Therapeutic Exercises:   Stated just completed with PT. Instructed on benefits in prep for ADLs. As well as increase reps, sets, standing and weight in prep for dynamic standing and lifting ADLs. Patient indicated understanding. Pain:      Activity Tolerance:   good    After treatment patient left in no apparent distress:   Sitting in chair and Call bell within reach    COMMUNICATION/COLLABORATION:   The patients plan of care was discussed with: Physical therapist and Registered nurse.      Jamila Maciel  Time Calculation: 12 mins

## 2021-04-01 NOTE — PROGRESS NOTES
[facility] ICU Progress Note    Admit Date: 3/26/2021  POD:  2 Day Post-Op    Procedure:  Procedure(s):  CABG X 3 WITH CPB: DONOR SITES: LIMA AND RIGHT SAPHENOUS VEIN VIA EVH; YOJANA AND EPIAORTIC BY DR. Johnathan Collazo        Subjective:   Pt seen with Dr. Adria Gill. Afebrile, Oxygen at 2L per NC. Up in the chair. Feeling a little better today. Some nausea with small vomiting last night. Objective:   Vitals:  Blood pressure (!) 106/57, pulse 88, temperature 98.2 °F (36.8 °C), resp. rate 27, height 5' 4\" (1.626 m), weight 161 lb 3.2 oz (73.1 kg), SpO2 91 %. Temp (24hrs), Av.4 °F (36.9 °C), Min:98.2 °F (36.8 °C), Max:98.6 °F (37 °C)    EKG/Rhythm: SR in the 80s    CT Output: 140 ml + 180 ml (last 24 hours)    Oxygen Therapy:  Oxygen Therapy  O2 Sat (%): 91 % (21 1500)  Pulse via Oximetry: 50 beats per minute (21 1041)  O2 Device: Nasal cannula (21 1200)  Skin Assessment: Clean, dry, & intact (21 0800)  O2 Flow Rate (L/min): 2 l/min (21 1200)  FIO2 (%): 50 % (21 2000)    CXR:  CXR Results  (Last 48 hours)               21 0514  XR CHEST PORT Final result    Impression:  1. No significant change in hypoventilatory lung fields with bibasilar   opacities strongly favored to represent atelectasis. 2.  Interval removal of a [place]-Efe catheter. Remaining lines and tubes without   evidence of complication. Narrative:  EXAM:  XR CHEST PORT       INDICATION: postop heart       COMPARISON: One day prior. TECHNIQUE: Single frontal view of the chest.       FINDINGS: Lungs are hypoventilatory with unchanged bibasilar opacities which are   strongly favored to represent atelectasis. No evidence of a pleural effusion or   pneumothorax. Interval removal of a [place]-Efe catheter. The right IJ catheter   remains in place with tip projecting in right atrium. Chest tubes remain in   place. Cardiomediastinal silhouette measures at the upper limits of normal   status post median sternotomy. 03/31/21 0551  XR CHEST PORT Final result    Impression:  Decreased lung volumes with patchy bibasilar atelectasis. Narrative:  EXAM: XR CHEST PORT       INDICATION: postop heart       COMPARISON: 3/30/2021       FINDINGS: A portable AP radiograph of the chest was obtained at 435 hours. Patient has been extubated with a nasogastric tube removed. Other tubes and   lines are stable. . Lung volumes are decreased. Patchy opacities in bilateral   lung bases likely atelectasis. . The cardiac and mediastinal contours and   pulmonary vascularity are normal.  The bones and soft tissues are grossly within   normal limits. 03/30/21 1636  XR CHEST PORT Final result    Impression:  There is slight discoid atelectasis lung bases. There is no   pneumothorax. Narrative:  EXAM:  XR CHEST PORT       INDICATION:  postop heart       COMPARISON:  3/26/2071       FINDINGS: A portable AP radiograph of the chest was obtained at 1752 hours. ET   tube is in satisfactory position. [place]-Efe catheter overlies right pulmonary   artery. Left chest tubes and mediastinal drains are noted in position. NG tube   appears to overlie the stomach. .  Lungs are clear except for minor bibasilar   atelectasis. There is no pneumothorax. Justino Ilya Heart size is upper limits of normal. .                       Admission Weight: Last Weight   Weight: 162 lb 7.7 oz (73.7 kg) Weight: 161 lb 3.2 oz (73.1 kg)     Intake / Output / Drain:  Current Shift: 04/01 0701 - 04/01 1900  In: 101 [P.O.:100; I.V.:1]  Out: 0   Last 24 hrs.:     Intake/Output Summary (Last 24 hours) at 4/1/2021 1533  Last data filed at 4/1/2021 0853  Gross per 24 hour   Intake 226 ml   Output 640 ml   Net -414 ml       EXAM:  General:  No acute distress   Lungs:   Decreased aeration in the bases, +rub   Incision:  Dressing clean, dry, and intact   Heart:  Regular rate and rhythm, S1, S2 normal, no murmur, click, rub or gallop. Abdomen:   Soft, non-tender.  Bowel sounds hypoactive. No masses,  No organomegaly. Extremities:  No edema. PPP. Neurologic:  Gross motor and sensory apparatus intact. Labs:   Recent Labs     21  0909 21  0415 21  0415 21  1617 21  1617   WBC  --   --  12.6*   < > 19.0*   HGB  --   --  8.9*   < > 11.3*   HCT  --   --  28.1*   < > 33.2*   PLT  --   --  116*   < > 165   NA  --   --  139   < > 136   K  --   --  3.7   < > 4.6   BUN  --   --  22*   < > 15   CREA  --   --  0.83   < > 0.87   GLU  --   --  80   < > 120*   GLUCPOC 108*   < >  --    < > 125*   INR  --   --   --   --  1.3*    < > = values in this interval not displayed. Assessment:     Active Problems:    CAD (coronary artery disease) (3/25/2021)      HLD (hyperlipidemia) (3/26/2021)      S/P CABG x 3 (3/30/2021)         Plan/Recommendations/Medical Decision Makin. CAD 2VD s/p CABG with Dr. Davidson Valadez 3/30. On ASA, Statin. Will start low dose BB today. 2. Acute systolic dysfunction, NYHA class II: LVEF 45%. Add low dose BB today. Will start GDTM postop as appropriate. 3. HLD: continue lipitor     4. Atelectasis:  IS and TCDB. Increase activity. Wean oxygen to keep sat >92%. 5. Anemia: Postoperative secondary to acute blood loss. Currently above transfusion threshold. Monitor daily CBC. 6. Thrombocytopenia: Due to CABG/pump run. Platelets to 886L today. Continue ASA for now. Switch Pepcid to PPI. Continue to monitor.     Dispo: PT/OT/Cardiac Rehab. Case Management for discharge planning. CT out today. Can transfer to [facility].       Signed By: Conor Pool NP

## 2021-04-02 ENCOUNTER — APPOINTMENT (OUTPATIENT)
Dept: GENERAL RADIOLOGY | Age: 66
DRG: 236 | End: 2021-04-02
Attending: NURSE PRACTITIONER
Payer: MEDICARE

## 2021-04-02 LAB
ALBUMIN SERPL-MCNC: 3.5 G/DL (ref 3.5–5)
ALBUMIN/GLOB SERPL: 1.3 {RATIO} (ref 1.1–2.2)
ALP SERPL-CCNC: 68 U/L (ref 45–117)
ALT SERPL-CCNC: 19 U/L (ref 12–78)
ANION GAP SERPL CALC-SCNC: 6 MMOL/L (ref 5–15)
AST SERPL-CCNC: 25 U/L (ref 15–37)
BILIRUB SERPL-MCNC: 0.8 MG/DL (ref 0.2–1)
BUN SERPL-MCNC: 22 MG/DL (ref 6–20)
BUN/CREAT SERPL: 33 (ref 12–20)
CALCIUM SERPL-MCNC: 8.3 MG/DL (ref 8.5–10.1)
CHLORIDE SERPL-SCNC: 105 MMOL/L (ref 97–108)
CO2 SERPL-SCNC: 27 MMOL/L (ref 21–32)
CREAT SERPL-MCNC: 0.67 MG/DL (ref 0.55–1.02)
ERYTHROCYTE [DISTWIDTH] IN BLOOD BY AUTOMATED COUNT: 13.4 % (ref 11.5–14.5)
GLOBULIN SER CALC-MCNC: 2.8 G/DL (ref 2–4)
GLUCOSE SERPL-MCNC: 96 MG/DL (ref 65–100)
HCT VFR BLD AUTO: 27.7 % (ref 35–47)
HGB BLD-MCNC: 8.9 G/DL (ref 11.5–16)
MAGNESIUM SERPL-MCNC: 2.5 MG/DL (ref 1.6–2.4)
MCH RBC QN AUTO: 30 PG (ref 26–34)
MCHC RBC AUTO-ENTMCNC: 32.1 G/DL (ref 30–36.5)
MCV RBC AUTO: 93.3 FL (ref 80–99)
NRBC # BLD: 0 K/UL (ref 0–0.01)
NRBC BLD-RTO: 0 PER 100 WBC
PLATELET # BLD AUTO: 122 K/UL (ref 150–400)
PMV BLD AUTO: 10.3 FL (ref 8.9–12.9)
POTASSIUM SERPL-SCNC: 4.2 MMOL/L (ref 3.5–5.1)
PROT SERPL-MCNC: 6.3 G/DL (ref 6.4–8.2)
RBC # BLD AUTO: 2.97 M/UL (ref 3.8–5.2)
SODIUM SERPL-SCNC: 138 MMOL/L (ref 136–145)
WBC # BLD AUTO: 9.1 K/UL (ref 3.6–11)

## 2021-04-02 PROCEDURE — 83735 ASSAY OF MAGNESIUM: CPT

## 2021-04-02 PROCEDURE — 97116 GAIT TRAINING THERAPY: CPT

## 2021-04-02 PROCEDURE — 65660000001 HC RM ICU INTERMED STEPDOWN

## 2021-04-02 PROCEDURE — 85027 COMPLETE CBC AUTOMATED: CPT

## 2021-04-02 PROCEDURE — 74011250637 HC RX REV CODE- 250/637: Performed by: NURSE PRACTITIONER

## 2021-04-02 PROCEDURE — 74011250636 HC RX REV CODE- 250/636: Performed by: NURSE PRACTITIONER

## 2021-04-02 PROCEDURE — 80053 COMPREHEN METABOLIC PANEL: CPT

## 2021-04-02 PROCEDURE — 71045 X-RAY EXAM CHEST 1 VIEW: CPT

## 2021-04-02 PROCEDURE — 36415 COLL VENOUS BLD VENIPUNCTURE: CPT

## 2021-04-02 PROCEDURE — 97530 THERAPEUTIC ACTIVITIES: CPT

## 2021-04-02 PROCEDURE — 77010033678 HC OXYGEN DAILY

## 2021-04-02 RX ORDER — PANTOPRAZOLE SODIUM 40 MG/1
40 TABLET, DELAYED RELEASE ORAL
Status: DISCONTINUED | OUTPATIENT
Start: 2021-04-03 | End: 2021-04-05 | Stop reason: HOSPADM

## 2021-04-02 RX ORDER — FUROSEMIDE 10 MG/ML
20 INJECTION INTRAMUSCULAR; INTRAVENOUS ONCE
Status: COMPLETED | OUTPATIENT
Start: 2021-04-02 | End: 2021-04-02

## 2021-04-02 RX ADMIN — Medication 10 ML: at 22:12

## 2021-04-02 RX ADMIN — METOPROLOL TARTRATE 12.5 MG: 25 TABLET, FILM COATED ORAL at 10:08

## 2021-04-02 RX ADMIN — CHLORHEXIDINE GLUCONATE 10 ML: 1.2 RINSE ORAL at 10:08

## 2021-04-02 RX ADMIN — AMIODARONE HYDROCHLORIDE 400 MG: 200 TABLET ORAL at 22:11

## 2021-04-02 RX ADMIN — METOPROLOL TARTRATE 12.5 MG: 25 TABLET, FILM COATED ORAL at 22:11

## 2021-04-02 RX ADMIN — OXYCODONE 5 MG: 5 TABLET ORAL at 00:39

## 2021-04-02 RX ADMIN — FUROSEMIDE 20 MG: 10 INJECTION, SOLUTION INTRAMUSCULAR; INTRAVENOUS at 10:08

## 2021-04-02 RX ADMIN — DOCUSATE SODIUM 50 MG AND SENNOSIDES 8.6 MG 1 TABLET: 8.6; 5 TABLET, FILM COATED ORAL at 19:23

## 2021-04-02 RX ADMIN — Medication 10 ML: at 06:00

## 2021-04-02 RX ADMIN — POLYETHYLENE GLYCOL 3350 17 G: 17 POWDER, FOR SOLUTION ORAL at 10:09

## 2021-04-02 RX ADMIN — MAGNESIUM GLUCONATE 500 MG ORAL TABLET 400 MG: 500 TABLET ORAL at 10:08

## 2021-04-02 RX ADMIN — ASPIRIN 81 MG: 81 TABLET, CHEWABLE ORAL at 10:08

## 2021-04-02 RX ADMIN — METOCLOPRAMIDE 10 MG: 5 INJECTION, SOLUTION INTRAMUSCULAR; INTRAVENOUS at 00:39

## 2021-04-02 RX ADMIN — CHLORHEXIDINE GLUCONATE 10 ML: 1.2 RINSE ORAL at 22:12

## 2021-04-02 RX ADMIN — AMIODARONE HYDROCHLORIDE 400 MG: 200 TABLET ORAL at 10:08

## 2021-04-02 RX ADMIN — DOCUSATE SODIUM 50 MG AND SENNOSIDES 8.6 MG 1 TABLET: 8.6; 5 TABLET, FILM COATED ORAL at 10:08

## 2021-04-02 RX ADMIN — MUPIROCIN: 20 OINTMENT TOPICAL at 19:23

## 2021-04-02 RX ADMIN — MUPIROCIN: 20 OINTMENT TOPICAL at 10:09

## 2021-04-02 RX ADMIN — ATORVASTATIN CALCIUM 40 MG: 40 TABLET, FILM COATED ORAL at 10:08

## 2021-04-02 NOTE — CARDIO/PULMONARY
Cardiac Rehab: CABG education folder at bedside. Met with Nancy Choi to review cardiac surgery post discharge instructions and to discuss participation in the Cardiac Rehab Program.  
 
Educated using teach back method. Reviewed the use of bear for sternal support, daily weight and temperature monitoring, showering restrictions, signs and symptoms of infection at surgery sites, daily walking and arm exercises, and use of incentive spirometer. She has on red reminder bracelet. Discussed purpose of bracelet, duration of wear, and when to call surgeons office. Discussed Cardiac Rehab Program format, benefits, and encouraged participation. [facility] cardiac rehab is on [facility] and she would like to attend there.

## 2021-04-02 NOTE — DIABETES MGMT
Diabetes Management Team to sign off at this point as patient's blood glucose remains stable. Please re-consult us if patient needs change. Thank you for including us in their care.        Signed By: My Marshall [facility]   Program for Diabetes Health    April 2, 2021

## 2021-04-02 NOTE — PROGRESS NOTES
1930: Bedside shift report received from [address] [place] [address] Ave.: Pt assisted back to bed.

## 2021-04-02 NOTE — PROGRESS NOTES
[facility] Progress Note    Admit Date: 3/26/2021  POD:  3 Day Post-Op    Procedure:  Procedure(s):  CABG X 3 WITH CPB: DONOR SITES: LIMA AND RIGHT SAPHENOUS VEIN VIA EVH; YOJANA AND EPIAORTIC BY DR. Samuel Christensen        Subjective:   Pt seen with Dr. Amador [place]. Afebrile, Oxygen at 2L per NC. Up in the chair. Feeling a little better today since CT removed yesterday. Objective:   Vitals:  Blood pressure (!) 102/55, pulse 80, temperature 97.9 °F (36.6 °C), resp. rate 19, height 5' 4\" (1.626 m), weight 164 lb 10.9 oz (74.7 kg), SpO2 93 %. Temp (24hrs), Av.3 °F (36.8 °C), Min:97.9 °F (36.6 °C), Max:98.6 °F (37 °C)    EKG/Rhythm: SR in the 80s    Oxygen Therapy:  Oxygen Therapy  O2 Sat (%): 93 % (21 0755)  Pulse via Oximetry: 73 beats per minute (21)  O2 Device: Nasal cannula (21)  Skin Assessment: Clean, dry, & intact (21 0800)  O2 Flow Rate (L/min): 2 l/min (21 0414)  FIO2 (%): 50 % (21)    CXR:  CXR Results  (Last 48 hours)               21 0506  XR CHEST PORT Final result    Impression:      Stable low lung volumes with mild bibasilar atelectasis. Narrative:  EXAM:  XR CHEST PORT       INDICATION: Low lung volumes with bibasilar opacities. COMPARISON: 2021 at 0418 hours       TECHNIQUE: Portable AP semiupright chest view at 0402 hours       FINDINGS: The right IJ cordis catheter, mediastinal drains, and left chest tube   has been removed. There is stable cardiac silhouette enlargement. The pulmonary   vasculature is within normal limits. There are stable low lung volumes with mild bibasilar opacities. There is no   pleural effusion or pneumothorax. The bones and upper abdomen are stable. 21 0514  XR CHEST PORT Final result    Impression:  1. No significant change in hypoventilatory lung fields with bibasilar   opacities strongly favored to represent atelectasis. 2.  Interval removal of a [place]-Efe catheter.  Remaining lines and tubes without   evidence of complication. Narrative:  EXAM:  XR CHEST PORT       INDICATION: postop heart       COMPARISON: One day prior. TECHNIQUE: Single frontal view of the chest.       FINDINGS: Lungs are hypoventilatory with unchanged bibasilar opacities which are   strongly favored to represent atelectasis. No evidence of a pleural effusion or   pneumothorax. Interval removal of a [place]-Efe catheter. The right IJ catheter   remains in place with tip projecting in right atrium. Chest tubes remain in   place. Cardiomediastinal silhouette measures at the upper limits of normal   status post median sternotomy. Admission Weight: Last Weight   Weight: 162 lb 7.7 oz (73.7 kg) Weight: 164 lb 10.9 oz (74.7 kg)     Intake / Output / Drain:  Current Shift: No intake/output data recorded. Last 24 hrs.:     Intake/Output Summary (Last 24 hours) at 4/2/2021 0934  Last data filed at 4/2/2021 0033  Gross per 24 hour   Intake 585 ml   Output 500 ml   Net 85 ml       EXAM:  General:  No acute distress   Lungs: Inspiratory crackles in the bases. Incision:  Dressing removed. No significant erythema, drainage, or dehiscence   Heart:  Regular rate and rhythm, S1, S2 normal, no murmur, click, rub or gallop. Abdomen:   Soft, non-tender. Bowel sounds hypoactive. No masses,  No organomegaly. +flatus, no BM yet   Extremities:  No edema. PPP. Neurologic:  Gross motor and sensory apparatus intact. Labs:   Recent Labs     04/02/21  0419 04/01/21 2027 03/30/21  1617 03/30/21  1617   WBC 9.1  --    < > 19.0*   HGB 8.9*  --    < > 11.3*   HCT 27.7*  --    < > 33.2*   *  --    < > 165     --    < > 136   K 4.2  --    < > 4.6   BUN 22*  --    < > 15   CREA 0.67  --    < > 0.87   GLU 96  --    < > 120*   GLUCPOC  --  128*   < > 125*   INR  --   --   --  1.3*    < > = values in this interval not displayed.         Assessment:     Active Problems:    CAD (coronary artery disease) (3/25/2021)      HLD (hyperlipidemia) (3/26/2021)      S/P CABG x 3 (3/30/2021)         Plan/Recommendations/Medical Decision Makin. CAD 2VD s/p CABG with Dr. Jaiden Chavez 3/30. On ASA, Statin, BB.    2. Acute systolic dysfunction, NYHA class II: LVEF 45%. Continue BB. Will diurese today. No ACEi/ARB, AA yet due to borderline BP. Will start GDTM postop as appropriate. 3. HLD: continue lipitor     4. Atelectasis:  IS and TCDB. Increase activity. Wean oxygen to keep sat >92%. 5. Anemia: Postoperative secondary to acute blood loss. Currently above transfusion threshold. Continue iron. Monitor daily CBC. 6. Thrombocytopenia: Due to CABG/pump run. Platelets to 187S today. Continue ASA for now. Switched Pepcid to PPI. Continue to monitor.     Dispo: PT/OT/Cardiac Rehab. Case Management for discharge planning.  Has tolerated BB-will DC PW today      Signed By: Compa Serrano NP

## 2021-04-02 NOTE — PROGRESS NOTES
0020: TRANSFER - IN REPORT:    Verbal report received from [address] RN(name) on Adria Aguila  being received from CCU(unit) for routine progression of care      Report consisted of patients Situation, Background, Assessment and   Recommendations(SBAR). Information from the following report(s) SBAR, Kardex, Intake/Output, MAR and Cardiac Rhythm NSR was reviewed with the receiving nurse. Opportunity for questions and clarification was provided. Assessment completed upon patients arrival to unit and care assumed. 0033: Arrived to unit. Vital signs are stable. 0730: Bedside shift change report given to Dayo Jhaveri RN (oncoming nurse) by Hardik Bower RN (offgoing nurse). Report included the following information SBAR, Kardex, Intake/Output, MAR and Cardiac Rhythm NSR.

## 2021-04-02 NOTE — PROCEDURES
[facility] Procedure Note:    Patient placed in the bed. Pacing wire insertion site cleaned with Chlorhexadine. Sutures removed. Atrial wires X 2 pulled and Bipolar ventricular wire X 1 (cut), inspected-intact. Patient tolerated the procedure well. Bedrest X 1 hour. RN aware.      Radha Lawson NP

## 2021-04-02 NOTE — PROGRESS NOTES
Comprehensive Nutrition Assessment    Type and Reason for Visit: Initial, RD nutrition re-screen/LOS    Nutrition Recommendations/Plan:    1. Encourage protein with meals   - family may bring foods from home  2. Bowel regimen adjustments as indicated    Nutrition Assessment:    Pt admitted for CAD. PMHx: HLD. S/p CABG x 3 on 3/30. Diuresis in place. Pt visited today. Eating well prior to surgery but poor intake since surgery noted. Some nausea but improved this morning. Goal for BM today with bowel regimen in place. Pt ate about 33% of [place] toast this morning. Sister-in-law bringing chicken salad and yogurt for lunch. Agreeable to yogurt with breakfast daily and trial of Ensure Enlive BID (700kcal, 40g protein). Reviewed importance of good protein intake for healing and recovery. No questions at this time. Diet also liberalize some for more options. Malnutrition Assessment:  Malnutrition Status: At risk for malnutrition (specify)(fair intake post-op)      Nutritionally Significant Medications: amiodarone, mag-ox, protonix, miralax, pericolace; PRN: zofran    Estimated Daily Nutrient Needs:  Energy (kcal): 1817-1968kcal(MSJ x 1.2-1.3 x 1.2); Weight Used for Energy Requirements: Admission(74kg)  Protein (g): 89 - 1.3g/kg;  Weight Used for Protein Requirements: Admission(74kg)  Fluid (ml/day): 1900; Method Used for Fluid Requirements: 1 ml/kcal    Nutrition Related Findings:       BM: 3/28   Edema: trace  Wounds:  Surgical incision(sternal)       Current Nutrition Therapies:   Diet: cardiac  Supplements: none  Additional Caloric Sources: none    Meal intake:   Patient Vitals for the past 168 hrs:   % Diet Eaten   04/01/21 1800 15 %   04/01/21 1300 15 %   04/01/21 0853 10 %   03/30/21 2000 0 %   03/30/21 0819 0 %   03/29/21 1900 100 %   03/29/21 1143 100 %   03/29/21 0758 50 %   03/27/21 1122 100 %   03/27/21 0759 100 %   03/26/21 1744 60 %   03/26/21 1257 50 %     Anthropometric Measures:  · Height:  5' 4\" (162.6 cm)  · Current Body Wt:  73.7 kg (162 lb 7.7 oz)(admit)   · Admission Body Wt:  162 lb 7.7 oz    · Usual Body Wt:        · Ideal Body Wt:  120:  135.4 %   Wt Readings from Last 10 Encounters:   04/02/21 74.7 kg (164 lb 10.9 oz)   03/25/21 74 kg (163 lb 1.6 oz)     Nutrition Diagnosis:   · Inadequate protein-energy intake related to increased demand for energy/nutrients(poor appetite) as evidenced by intake 51-75%(s/p CABG)    Nutrition Interventions:   Food and/or Nutrient Delivery: Modify current diet, Start oral nutrition supplement, Snacks (specify)  Nutrition Education and Counseling: Education initiated  Coordination of Nutrition Care: Continue to monitor while inpatient    Goals:  Consumption of at least 75% meals and 1-2 supplements/day in 4-5 days       Nutrition Monitoring and Evaluation:   Behavioral-Environmental Outcomes: None identified  Food/Nutrient Intake Outcomes: Food and nutrient intake, Supplement intake  Physical Signs/Symptoms Outcomes: Biochemical data, Weight    Discharge Planning:    Continue current diet, Continue oral nutrition supplement     Brooke Schaefer, RD  [address] , Pager #[phone] or via [employer]

## 2021-04-02 NOTE — PROGRESS NOTES
Problem: Mobility Impaired (Adult and Pediatric)  Goal: *Acute Goals and Plan of Care (Insert Text)  Description: FUNCTIONAL STATUS PRIOR TO ADMISSION: Patient was independent and active without use of DME. Patient recently retired from [email or web address] about 2 months ago. Patient is from [place], [place] however is now living in [place] with her brother/sister-in-law after the losses of her mother, daughter, and . HOME SUPPORT: Patient will be staying with her brother who can assist her as needed. Physical Therapy Goals  Initiated 3/31/2021  1. Patient will move from supine to sit and sit to supine , scoot up and down, and roll side to side in bed with modified independence within 5 days. 2.  Patient will perform sit to/from stand with modified independence within 5 days. 3.  Patient will ambulate 300 feet with least restrictive assistive device and independence within 5 days. 4.  Patient will ascend/descend 2 stairs with  handrail(s) with modified independence within 5 days. 5.  Patient will perform cardiac exercises per protocol with independence within 5 days. 6.  Patient will verbally recall and functionally demonstrate mindful-based movements (\"move in the tube\") principles without cues within 5 days. Outcome: Progressing Towards Goal   PHYSICAL THERAPY TREATMENT  Patient: Kylah Bartholomew (30 y.o. female)  Date: 4/2/2021  Diagnosis: CAD (coronary artery disease) [I25.10] <principal problem not specified>  Procedure(s) (LRB):  CABG X 3 WITH CPB: DONOR SITES: LIMA AND RIGHT SAPHENOUS VEIN VIA EVH; YOJANA AND EPIAORTIC BY DR. Mariana Rebolledo (N/A) 3 Days Post-Op  Precautions: Fall(\"move in the tube\")  Chart, physical therapy assessment, plan of care and goals were reviewed. ASSESSMENT  Patient continues with skilled PT services and is progressing towards goals. Received pt resting in bed on room air, SpO2 88% supine. O2 sat remained 88-89% sitting EOB.    Improved to 92% with cues/ instruction in slow deep breathing. Patient demonstrates increased tolerance to activity. She ambulated 250 ft with slow steady gait while on RA. Attempted to monitor SpO2 w/ finger monitor throughout ambulation. Unable d/t poor pleth. Patient voiced no SOB nor did she appear SOB. Once back in the room, resumed wall monitor w/ ear placement. SpO2 upper 90's on RA. For the weekend: Recommend with nursing, once Egress Test completed, OOB to chair 3x/day and walking daily with contact guard to SBA and no device with ear lobe pulse ox. . Thank you for completing as able in order to maintain patient strength, endurance and independence. Patient is verbalizing and is demonstrating understanding of mindful-based movements (\"move in the tube\") principles of keeping UEs proximal to ribcage to prevent lateral pull on the sternum during load-bearing activities with verbal cues required for compliance. Current Level of Function Impacting Discharge (mobility/balance): Up to CGA    Other factors to consider for discharge:          PLAN :  Patient continues to benefit from skilled intervention to address the above impairments. Continue treatment per established plan of care. to address goals. Recommendation for discharge: (in order for the patient to meet his/her long term goals)  Physical therapy at least 2 days/week in the home     This discharge recommendation:  A follow-up discussion with the attending provider and/or case management is planned    IF patient discharges home will need the following DME: none       SUBJECTIVE:   Patient stated I've been trying to nap.     OBJECTIVE DATA SUMMARY:   Patient mobilized on continuous portable monitor/telemetry.   Critical Behavior:  Neurologic State: Alert  Orientation Level: Oriented X4  Cognition: Appropriate for age attention/concentration, Appropriate decision making, Appropriate safety awareness, Follows commands  Safety/Judgement: Decreased insight into deficits    Functional Mobility Training:  Bed Mobility:  Supine to Sit: Contact guard assistance(to guide movement for mindful-based movement)  Sit to Supine: Contact guard assistance  Scooting: Supervision          Transfers:  Sit to Stand: Supervision  Stand to Sit: Supervision                               Balance:  Sitting: Intact; Without support  Standing: Intact; Without support    Ambulation/Gait Training:  Distance (ft): 250 Feet (ft) one therapy directed standing rest break to reassess O2 sat. Pt voiced no SOB. Unable to monitor sat d/t poor pleth. Ambulation - Level of Assistance: Contact guard assistance;Assist x1        Gait Abnormalities: Decreased step clearance        Base of Support: Widened     Speed/Staci: Slow;Pace decreased (<100 feet/min)  Step Length: Right shortened;Left shortened                Cardiac diagnosis intervention:  Patient instructed and educated on mindful movement principles based on Move in The Tube concept to include maintaining bilateral elbows close to rib cage when performing any load-bearing activity such as getting in/out of bed, pushing up from a chair, or lifting a box. Patient has the handout with diagrams of each correct/incorrect method of performing each of the above tasks. Therapeutic Exercises:   Patient instructed on the benefits and demonstrated cardiac exercises while sitting/ standing with Supervision. Instructed and indicated understanding on how to progress reps, sets against gravity, pacing through progressive muscle strengthening standing based on surgeon clearance.     CARDIAC  EXERCISE   Sets   Reps   Active Active Assist   Passive Self ROM   Comments   Shoulder flexion 1 5 [x]                                            []                                            []                                            []                                               Shoulder abduction 1      5 [x]                                            [] []                                            []                                               Scapular elevation 1 5 [x]                                            []                                            []                                            []                                               Scapular retraction 1 5 [x]                                            []                                            []                                            []                                               Trunk rotation 1 5 [x]                                            []                                            []                                            []                                               Trunk sidebending 1 5 [x]                                            []                                            []                                            []                                                  []                                            []                                            []                                            []                                                   Pain Rating:      Activity Tolerance:   Fair    After treatment patient left in no apparent distress:   Supine in bed, Call bell within reach and Side rails x 3    COMMUNICATION/COLLABORATION:   The patients plan of care was discussed with: Registered nurse.      Zuri Lorenzana, PT  Time Calculation: 25 mins

## 2021-04-03 ENCOUNTER — APPOINTMENT (OUTPATIENT)
Dept: GENERAL RADIOLOGY | Age: 66
DRG: 236 | End: 2021-04-03
Attending: NURSE PRACTITIONER
Payer: MEDICARE

## 2021-04-03 PROCEDURE — 71045 X-RAY EXAM CHEST 1 VIEW: CPT

## 2021-04-03 PROCEDURE — 74011250637 HC RX REV CODE- 250/637: Performed by: NURSE PRACTITIONER

## 2021-04-03 PROCEDURE — 65660000001 HC RM ICU INTERMED STEPDOWN

## 2021-04-03 RX ORDER — FUROSEMIDE 40 MG/1
40 TABLET ORAL DAILY
Status: DISCONTINUED | OUTPATIENT
Start: 2021-04-03 | End: 2021-04-05 | Stop reason: HOSPADM

## 2021-04-03 RX ADMIN — METOPROLOL TARTRATE 12.5 MG: 25 TABLET, FILM COATED ORAL at 09:12

## 2021-04-03 RX ADMIN — DOCUSATE SODIUM 50 MG AND SENNOSIDES 8.6 MG 1 TABLET: 8.6; 5 TABLET, FILM COATED ORAL at 18:12

## 2021-04-03 RX ADMIN — MAGNESIUM GLUCONATE 500 MG ORAL TABLET 400 MG: 500 TABLET ORAL at 18:11

## 2021-04-03 RX ADMIN — ATORVASTATIN CALCIUM 40 MG: 40 TABLET, FILM COATED ORAL at 09:12

## 2021-04-03 RX ADMIN — DOCUSATE SODIUM 50 MG AND SENNOSIDES 8.6 MG 1 TABLET: 8.6; 5 TABLET, FILM COATED ORAL at 09:12

## 2021-04-03 RX ADMIN — CHLORHEXIDINE GLUCONATE 10 ML: 1.2 RINSE ORAL at 21:47

## 2021-04-03 RX ADMIN — ASPIRIN 81 MG: 81 TABLET, CHEWABLE ORAL at 09:12

## 2021-04-03 RX ADMIN — MAGNESIUM GLUCONATE 500 MG ORAL TABLET 400 MG: 500 TABLET ORAL at 09:12

## 2021-04-03 RX ADMIN — METOPROLOL TARTRATE 12.5 MG: 25 TABLET, FILM COATED ORAL at 21:47

## 2021-04-03 RX ADMIN — FUROSEMIDE 40 MG: 40 TABLET ORAL at 15:33

## 2021-04-03 RX ADMIN — AMIODARONE HYDROCHLORIDE 400 MG: 200 TABLET ORAL at 21:47

## 2021-04-03 RX ADMIN — Medication 10 ML: at 21:47

## 2021-04-03 RX ADMIN — Medication 10 ML: at 08:17

## 2021-04-03 RX ADMIN — CHLORHEXIDINE GLUCONATE 10 ML: 1.2 RINSE ORAL at 09:00

## 2021-04-03 RX ADMIN — MUPIROCIN: 20 OINTMENT TOPICAL at 09:12

## 2021-04-03 RX ADMIN — MUPIROCIN: 20 OINTMENT TOPICAL at 21:47

## 2021-04-03 RX ADMIN — AMIODARONE HYDROCHLORIDE 400 MG: 200 TABLET ORAL at 09:12

## 2021-04-03 RX ADMIN — PANTOPRAZOLE SODIUM 40 MG: 40 TABLET, DELAYED RELEASE ORAL at 08:17

## 2021-04-03 NOTE — PROGRESS NOTES
CSS Progress Note    Admit Date: 3/26/2021  POD:  4 Day Post-Op    Procedure:  Procedure(s):  CABG X 3 WITH CPB: DONOR SITES: LIMA AND RIGHT SAPHENOUS VEIN VIA EVH; YOJANA AND EPIAORTIC BY DR. Samuel Christensen        Subjective:   Pt seen with Dr. Amador Felt. Afebrile, Oxygen at 1L per NC. Desat to 88% on RA. Up in chair. Objective:   Vitals:  Blood pressure (!) 112/59, pulse 81, temperature 97.8 °F (36.6 °C), resp. rate 18, height 5' 4\" (1.626 m), weight 160 lb 0.9 oz (72.6 kg), SpO2 91 %. Temp (24hrs), Av.2 °F (36.8 °C), Min:97.8 °F (36.6 °C), Max:98.7 °F (37.1 °C)    EKG/Rhythm: SR in the 80s    Oxygen Therapy:  Oxygen Therapy  O2 Sat (%): 91 % (21 0814)  Pulse via Oximetry: 73 beats per minute (21)  O2 Device: Nasal cannula (21 0815)  Skin Assessment: Clean, dry, & intact (21 0800)  O2 Flow Rate (L/min): 1 l/min (21 0815)  FIO2 (%): 50 % (21)    CXR:  CXR Results  (Last 48 hours)               21 0506  XR CHEST PORT Final result    Impression:      Stable low lung volumes with mild bibasilar atelectasis. Narrative:  EXAM:  XR CHEST PORT       INDICATION: Low lung volumes with bibasilar opacities. COMPARISON: 2021 at 0418 hours       TECHNIQUE: Portable AP semiupright chest view at 0402 hours       FINDINGS: The right IJ cordis catheter, mediastinal drains, and left chest tube   has been removed. There is stable cardiac silhouette enlargement. The pulmonary   vasculature is within normal limits. There are stable low lung volumes with mild bibasilar opacities. There is no   pleural effusion or pneumothorax. The bones and upper abdomen are stable.                    Admission Weight: Last Weight   Weight: 162 lb 7.7 oz (73.7 kg) Weight: 160 lb 0.9 oz (72.6 kg)     Intake / Output / Drain:  Current Shift:  0701 -  1900  In: -   Out: 250 [Urine:250]  Last 24 hrs.:     Intake/Output Summary (Last 24 hours) at 4/3/2021 0911  Last data filed at 4/3/2021 0815  Gross per 24 hour   Intake 320 ml   Output 650 ml   Net -330 ml       EXAM:  General:  No acute distress   Lungs: Inspiratory crackles in the bases. Incision:  No significant erythema, drainage, or dehiscence   Heart:  Regular rate and rhythm, S1, S2 normal, no murmur, click, rub or gallop. Abdomen:   Soft, non-tender. Bowel sounds hypoactive. No masses,  No organomegaly. +flatus, no BM yet   Extremities:  No edema. PPP. Neurologic:  Gross motor and sensory apparatus intact. Labs:   Recent Labs     21  0419 21   WBC 9.1  --    HGB 8.9*  --    HCT 27.7*  --    *  --      --    K 4.2  --    BUN 22*  --    CREA 0.67  --    GLU 96  --    GLUCPOC  --  128*        Assessment:     Active Problems:    CAD (coronary artery disease) (3/25/2021)      HLD (hyperlipidemia) (3/26/2021)      S/P CABG x 3 (3/30/2021)         Plan/Recommendations/Medical Decision Makin. CAD 2VD s/p CABG with Dr. Yimi Blanchard 3/30. On ASA, Statin, BB.    2. Acute systolic dysfunction, NYHA class II: LVEF 45%. Continue BB. Will again diurese today. No ACEi/ARB, AA yet due to borderline BP. Will start GDTM postop as appropriate. 3. HLD: continue lipitor     4. Atelectasis:  IS and TCDB. Increase activity. Wean oxygen to keep sat >92%. 5. Anemia: Postoperative secondary to acute blood loss. Currently above transfusion threshold. Continue iron. Monitor daily CBC. 6. Thrombocytopenia: Due to CABG/pump run. Continue ASA for now. Switched Pepcid to PPI. Continue to monitor.     Dispo: PT/OT/Cardiac Rehab. Case Management for discharge planning. Likely home in the next 1-2 days.       Signed By: Donna Colunga NP

## 2021-04-03 NOTE — PROGRESS NOTES
2000:Bedside shift change report given to Peggy (oncoming nurse) by Rose Marie Mora (offgoing nurse). Report included the following information SBAR, Intake/Output, MAR, Accordion, Recent Results, Med Rec Status and Cardiac Rhythm NSR.    2200: SPO2 down to 88-90% on room air while sleeping; placed on 2L NC with continuous SPO2 monitoring in place    0100: SPO2 remains >95% on 2L NC, titrated down to 1L NC with continuous SPO2 monitoring in place    0810:Bedside shift change report given to Rose Marie Mora (oncoming nurse) by Mal Bansal (offgoing nurse). Report included the following information SBAR, Intake/Output, MAR, Accordion, Recent Results, Med Rec Status and Cardiac Rhythm NSR     0815: Attempted to wean patient to room air once up in chair; SPO2 dropped to 88-92%, so patient placed back on 1L NC; IS use encouraged      Cardiac Surgery Shift Summary:    1. I/O's: Intake:   Meals: 25-50% of each meal (fair)     Output: WDL (voiding without difficulty)   Has patient had BM since surgery? Yes    2. Oxygen Requirements: Patient desaturated to 88 % on room air. Patient placed on 1L  (changing /new O2 requirement)    3. Daily Weights (weight change in 24 hours): No significant change in weight (0 - 2 lbs.)    4. Medication Administration: No variations to medication administration       .

## 2021-04-04 ENCOUNTER — APPOINTMENT (OUTPATIENT)
Dept: GENERAL RADIOLOGY | Age: 66
DRG: 236 | End: 2021-04-04
Attending: NURSE PRACTITIONER
Payer: MEDICARE

## 2021-04-04 LAB
ANION GAP SERPL CALC-SCNC: 5 MMOL/L (ref 5–15)
BUN SERPL-MCNC: 22 MG/DL (ref 6–20)
BUN/CREAT SERPL: 31 (ref 12–20)
CALCIUM SERPL-MCNC: 8.4 MG/DL (ref 8.5–10.1)
CHLORIDE SERPL-SCNC: 100 MMOL/L (ref 97–108)
CO2 SERPL-SCNC: 31 MMOL/L (ref 21–32)
CREAT SERPL-MCNC: 0.72 MG/DL (ref 0.55–1.02)
ERYTHROCYTE [DISTWIDTH] IN BLOOD BY AUTOMATED COUNT: 13.2 % (ref 11.5–14.5)
GLUCOSE SERPL-MCNC: 94 MG/DL (ref 65–100)
HCT VFR BLD AUTO: 30.6 % (ref 35–47)
HGB BLD-MCNC: 9.7 G/DL (ref 11.5–16)
MAGNESIUM SERPL-MCNC: 2.2 MG/DL (ref 1.6–2.4)
MCH RBC QN AUTO: 29.7 PG (ref 26–34)
MCHC RBC AUTO-ENTMCNC: 31.7 G/DL (ref 30–36.5)
MCV RBC AUTO: 93.6 FL (ref 80–99)
NRBC # BLD: 0 K/UL (ref 0–0.01)
NRBC BLD-RTO: 0 PER 100 WBC
PLATELET # BLD AUTO: 238 K/UL (ref 150–400)
PMV BLD AUTO: 9.9 FL (ref 8.9–12.9)
POTASSIUM SERPL-SCNC: 3.6 MMOL/L (ref 3.5–5.1)
RBC # BLD AUTO: 3.27 M/UL (ref 3.8–5.2)
SODIUM SERPL-SCNC: 136 MMOL/L (ref 136–145)
WBC # BLD AUTO: 7.2 K/UL (ref 3.6–11)

## 2021-04-04 PROCEDURE — 74011250637 HC RX REV CODE- 250/637: Performed by: NURSE PRACTITIONER

## 2021-04-04 PROCEDURE — 71045 X-RAY EXAM CHEST 1 VIEW: CPT

## 2021-04-04 PROCEDURE — 83735 ASSAY OF MAGNESIUM: CPT

## 2021-04-04 PROCEDURE — 77010033678 HC OXYGEN DAILY

## 2021-04-04 PROCEDURE — 85027 COMPLETE CBC AUTOMATED: CPT

## 2021-04-04 PROCEDURE — 36415 COLL VENOUS BLD VENIPUNCTURE: CPT

## 2021-04-04 PROCEDURE — 74011250636 HC RX REV CODE- 250/636: Performed by: NURSE PRACTITIONER

## 2021-04-04 PROCEDURE — 80048 BASIC METABOLIC PNL TOTAL CA: CPT

## 2021-04-04 PROCEDURE — 65660000001 HC RM ICU INTERMED STEPDOWN

## 2021-04-04 RX ORDER — POTASSIUM CHLORIDE 750 MG/1
20 TABLET, FILM COATED, EXTENDED RELEASE ORAL DAILY
Status: DISCONTINUED | OUTPATIENT
Start: 2021-04-04 | End: 2021-04-05 | Stop reason: HOSPADM

## 2021-04-04 RX ADMIN — POTASSIUM CHLORIDE 20 MEQ: 750 TABLET, FILM COATED, EXTENDED RELEASE ORAL at 19:05

## 2021-04-04 RX ADMIN — DOCUSATE SODIUM 50 MG AND SENNOSIDES 8.6 MG 1 TABLET: 8.6; 5 TABLET, FILM COATED ORAL at 09:18

## 2021-04-04 RX ADMIN — Medication 10 ML: at 05:03

## 2021-04-04 RX ADMIN — ASPIRIN 81 MG: 81 TABLET, CHEWABLE ORAL at 09:19

## 2021-04-04 RX ADMIN — PANTOPRAZOLE SODIUM 40 MG: 40 TABLET, DELAYED RELEASE ORAL at 09:18

## 2021-04-04 RX ADMIN — AMIODARONE HYDROCHLORIDE 400 MG: 200 TABLET ORAL at 09:19

## 2021-04-04 RX ADMIN — ONDANSETRON 4 MG: 2 INJECTION INTRAMUSCULAR; INTRAVENOUS at 19:06

## 2021-04-04 RX ADMIN — DOCUSATE SODIUM 50 MG AND SENNOSIDES 8.6 MG 1 TABLET: 8.6; 5 TABLET, FILM COATED ORAL at 19:06

## 2021-04-04 RX ADMIN — METOPROLOL TARTRATE 12.5 MG: 25 TABLET, FILM COATED ORAL at 20:11

## 2021-04-04 RX ADMIN — MAGNESIUM GLUCONATE 500 MG ORAL TABLET 400 MG: 500 TABLET ORAL at 09:18

## 2021-04-04 RX ADMIN — AMIODARONE HYDROCHLORIDE 400 MG: 200 TABLET ORAL at 20:11

## 2021-04-04 RX ADMIN — ATORVASTATIN CALCIUM 40 MG: 40 TABLET, FILM COATED ORAL at 09:19

## 2021-04-04 RX ADMIN — FUROSEMIDE 40 MG: 40 TABLET ORAL at 09:19

## 2021-04-04 RX ADMIN — CHLORHEXIDINE GLUCONATE 10 ML: 1.2 RINSE ORAL at 20:16

## 2021-04-04 RX ADMIN — MAGNESIUM GLUCONATE 500 MG ORAL TABLET 400 MG: 500 TABLET ORAL at 19:06

## 2021-04-04 RX ADMIN — Medication 10 ML: at 20:12

## 2021-04-04 RX ADMIN — METOPROLOL TARTRATE 12.5 MG: 25 TABLET, FILM COATED ORAL at 09:19

## 2021-04-04 NOTE — PROGRESS NOTES
1940: Bedside shift change report given to Peggy (oncoming nurse) by Yadira Banuelos (offgoing nurse).  Report included the following information SBAR, Intake/Output, MAR, Accordion, Recent Results, Med Rec Status and Cardiac Rhythm NSR.    0000: Oxygen weaned to 0.5L NC; continuous SPO2 monitoring in place

## 2021-04-04 NOTE — PROGRESS NOTES
CSS Progress Note    Admit Date: 3/26/2021  POD:  5 Day Post-Op    Procedure:  Procedure(s):  CABG X 3 WITH CPB: DONOR SITES: LIMA AND RIGHT SAPHENOUS VEIN VIA EVH; YOJANA AND EPIAORTIC BY DR. Johnathan Collazo        Subjective:   Pt seen with Dr. Jose Gray. Afebrile, Oxygen at 0.5L per NC. In bed. Wants to go home. Objective:   Vitals:  Blood pressure (!) 105/51, pulse 76, temperature 98.2 °F (36.8 °C), resp. rate 18, height 5' 4\" (1.626 m), weight 160 lb 0.9 oz (72.6 kg), SpO2 96 %. Temp (24hrs), Av.4 °F (36.9 °C), Min:98.2 °F (36.8 °C), Max:98.7 °F (37.1 °C)    EKG/Rhythm: SR in the 80s    Oxygen Therapy:  Oxygen Therapy  O2 Sat (%): 96 % (21 0730)  Pulse via Oximetry: 73 beats per minute (216)  O2 Device: Nasal cannula (21 0459)  Skin Assessment: Clean, dry, & intact (21 0800)  O2 Flow Rate (L/min): 0.5 l/min (21 0459)  FIO2 (%): 50 % (21 2000)    CXR:  CXR Results  (Last 48 hours)               21 1033  XR CHEST PORT Final result    Impression:  No significant interval change. Narrative:  EXAM: XR CHEST PORT       HISTORY: shortness of breath. COMPARISON: 2021       FINDINGS: Single view(s) of the chest. The patient is status post median   sternotomy and CABG. The lungs are underinflated. Mild bibasilar atelectasis is   again seen. No significant pleural effusion. No pneumothorax. The heart is   mildly enlarged, but unchanged. The visualized osseous structures are   unremarkable. Surgical clips are seen in the upper abdomen. Admission Weight: Last Weight   Weight: 162 lb 7.7 oz (73.7 kg) Weight: 160 lb 0.9 oz (72.6 kg)     Intake / Output / Drain:  Current Shift: No intake/output data recorded.   Last 24 hrs.:     Intake/Output Summary (Last 24 hours) at 2021 0945  Last data filed at 2021 0459  Gross per 24 hour   Intake 220 ml   Output 1000 ml   Net -780 ml       EXAM:  General:  No acute distress   Lungs:   Clear bilaterally   Incision:  No significant erythema, drainage, or dehiscence   Heart:  Regular rate and rhythm, S1, S2 normal, no murmur, click, rub or gallop. Abdomen:   Soft, non-tender. Bowel sounds hypoactive. No masses,  No organomegaly. +flatus, BM+ 4/2   Extremities:  No edema. PPP. Neurologic:  Gross motor and sensory apparatus intact. Labs:   Recent Labs     21   WBC 7.2   < >  --    HGB 9.7*   < >  --    HCT 30.6*   < >  --       < >  --       < >  --    K 3.6   < >  --    BUN 22*   < >  --    CREA 0.72   < >  --    GLU 94   < >  --    GLUCPOC  --   --  128*    < > = values in this interval not displayed. Assessment:     Active Problems:    CAD (coronary artery disease) (3/25/2021)      HLD (hyperlipidemia) (3/26/2021)      S/P CABG x 3 (3/30/2021)         Plan/Recommendations/Medical Decision Makin. CAD 2VD s/p CABG with Dr. Alexis Horton 3/30. On ASA, Statin, BB.    2. Acute systolic dysfunction, NYHA class II: LVEF 45%. Continue BB. Cont PO lasix. No ACEi/ARB, AA yet due to borderline BP. Will start GDTM postop as appropriate. 3. HLD: continue lipitor     4. Atelectasis:  IS and TCDB. Increase activity. Wean oxygen to keep sat >92%. Cont PO lasix. 5. Anemia: Postoperative secondary to acute blood loss. Currently above transfusion threshold. Continue iron. Monitor daily CBC. 6. Thrombocytopenia: improved. Switched Pepcid to PPI. Continue to monitor.     Dispo: PT/OT/Cardiac Rehab. Case Management for discharge planning. Need to get off O2, possibly home tomorrow.        Signed By: Arnoldo Malik NP

## 2021-04-04 NOTE — PROGRESS NOTES
1930: Bedside shift change report given to Peggy (oncoming nurse) by Vikash Banuelos (offgoing nurse). Report included the following information SBAR, Intake/Output, MAR, Accordion, Recent Results, Med Rec Status and Cardiac Rhythm NSR.     2030: Pt weaned to room air with continuous SPO2 monitoring in place    0815:Bedside shift change report given to Amber Mckeon (oncoming nurse) by Shelby Fontenot (offgoing nurse). Report included the following information SBAR, Intake/Output, MAR, Accordion, Recent Results, Med Rec Status and Cardiac Rhythm NSR. Cardiac Surgery Shift Summary:    1. I/O's: Intake:   Meals: 25-50% of each meal (fair)     Output: WDL (voiding without difficulty)   Has patient had BM since surgery? No     2. Oxygen Requirements: WDL (on room air)    3. Daily Weights (weight change in 24 hours): No significant change in weight (0 - 2 lbs.)    4.  Medication Administration: No variations to medication administration

## 2021-04-04 NOTE — PROCEDURES
[address]   PULMONARY FUNCTION TEST    Name:  Mallory Manley"  MR#:  [id number]  :  1955  ACCOUNT #:  [de-identified]  DATE OF SERVICE:  2021      INDICATION:  Preoperative evaluation. FINDINGS:  SPIROMETRY:  Normal FVC, normal FEV1, normal FEV1/FVC ratio. Technician noted reproducibility criteria per ATS guidelines were not met and interpretation may not be valid. IMPRESSION:  Normal spirometry but limitations as noted above. Clinical correlation is recommended.       Yan Poole MD      [id number]  D:  2021 12:06  T:  2021 21:12  JOB #:  [id number]

## 2021-04-05 ENCOUNTER — APPOINTMENT (OUTPATIENT)
Dept: GENERAL RADIOLOGY | Age: 66
DRG: 236 | End: 2021-04-05
Attending: NURSE PRACTITIONER
Payer: MEDICARE

## 2021-04-05 VITALS
OXYGEN SATURATION: 98 % | RESPIRATION RATE: 16 BRPM | SYSTOLIC BLOOD PRESSURE: 120 MMHG | HEART RATE: 72 BPM | DIASTOLIC BLOOD PRESSURE: 84 MMHG | WEIGHT: 156.97 LBS | HEIGHT: 64 IN | BODY MASS INDEX: 26.8 KG/M2 | TEMPERATURE: 98 F

## 2021-04-05 LAB
ANION GAP SERPL CALC-SCNC: 6 MMOL/L (ref 5–15)
BUN SERPL-MCNC: 21 MG/DL (ref 6–20)
BUN/CREAT SERPL: 27 (ref 12–20)
CALCIUM SERPL-MCNC: 8 MG/DL (ref 8.5–10.1)
CHLORIDE SERPL-SCNC: 99 MMOL/L (ref 97–108)
CO2 SERPL-SCNC: 30 MMOL/L (ref 21–32)
CREAT SERPL-MCNC: 0.78 MG/DL (ref 0.55–1.02)
ERYTHROCYTE [DISTWIDTH] IN BLOOD BY AUTOMATED COUNT: 13.4 % (ref 11.5–14.5)
GLUCOSE SERPL-MCNC: 86 MG/DL (ref 65–100)
HCT VFR BLD AUTO: 29.7 % (ref 35–47)
HGB BLD-MCNC: 9.8 G/DL (ref 11.5–16)
MAGNESIUM SERPL-MCNC: 2.2 MG/DL (ref 1.6–2.4)
MCH RBC QN AUTO: 28.9 PG (ref 26–34)
MCHC RBC AUTO-ENTMCNC: 33 G/DL (ref 30–36.5)
MCV RBC AUTO: 87.6 FL (ref 80–99)
NRBC # BLD: 0 K/UL (ref 0–0.01)
NRBC BLD-RTO: 0 PER 100 WBC
PLATELET # BLD AUTO: 253 K/UL (ref 150–400)
PMV BLD AUTO: 9.5 FL (ref 8.9–12.9)
POTASSIUM SERPL-SCNC: 3.7 MMOL/L (ref 3.5–5.1)
RBC # BLD AUTO: 3.39 M/UL (ref 3.8–5.2)
SODIUM SERPL-SCNC: 135 MMOL/L (ref 136–145)
WBC # BLD AUTO: 7.5 K/UL (ref 3.6–11)

## 2021-04-05 PROCEDURE — 74011250637 HC RX REV CODE- 250/637: Performed by: NURSE PRACTITIONER

## 2021-04-05 PROCEDURE — 83735 ASSAY OF MAGNESIUM: CPT

## 2021-04-05 PROCEDURE — 85027 COMPLETE CBC AUTOMATED: CPT

## 2021-04-05 PROCEDURE — 36415 COLL VENOUS BLD VENIPUNCTURE: CPT

## 2021-04-05 PROCEDURE — 80048 BASIC METABOLIC PNL TOTAL CA: CPT

## 2021-04-05 PROCEDURE — 71045 X-RAY EXAM CHEST 1 VIEW: CPT

## 2021-04-05 RX ORDER — AMOXICILLIN 250 MG
1 CAPSULE ORAL 2 TIMES DAILY
Status: SHIPPED | COMMUNITY
Start: 2021-04-05 | End: 2021-04-06 | Stop reason: SDUPTHER

## 2021-04-05 RX ORDER — METOPROLOL TARTRATE 25 MG/1
12.5 TABLET, FILM COATED ORAL 2 TIMES DAILY
Qty: 30 TAB | Refills: 2 | Status: SHIPPED | OUTPATIENT
Start: 2021-04-05 | End: 2021-07-04

## 2021-04-05 RX ORDER — FUROSEMIDE 40 MG/1
40 TABLET ORAL DAILY
Qty: 5 TAB | Refills: 0 | Status: SHIPPED | OUTPATIENT
Start: 2021-04-05 | End: 2021-04-10

## 2021-04-05 RX ORDER — POTASSIUM CHLORIDE 1500 MG/1
20 TABLET, FILM COATED, EXTENDED RELEASE ORAL DAILY
Qty: 5 TAB | Refills: 0 | Status: SHIPPED | OUTPATIENT
Start: 2021-04-05 | End: 2021-04-06 | Stop reason: SINTOL

## 2021-04-05 RX ORDER — AMIODARONE HYDROCHLORIDE 200 MG/1
TABLET ORAL
Qty: 84 TAB | Refills: 0 | Status: SHIPPED | OUTPATIENT
Start: 2021-04-05 | End: 2021-04-10 | Stop reason: SINTOL

## 2021-04-05 RX ADMIN — METOPROLOL TARTRATE 12.5 MG: 25 TABLET, FILM COATED ORAL at 09:30

## 2021-04-05 RX ADMIN — ASPIRIN 81 MG: 81 TABLET, CHEWABLE ORAL at 09:30

## 2021-04-05 RX ADMIN — CHLORHEXIDINE GLUCONATE 10 ML: 1.2 RINSE ORAL at 09:30

## 2021-04-05 RX ADMIN — MAGNESIUM GLUCONATE 500 MG ORAL TABLET 400 MG: 500 TABLET ORAL at 09:30

## 2021-04-05 RX ADMIN — Medication 10 ML: at 05:04

## 2021-04-05 RX ADMIN — PANTOPRAZOLE SODIUM 40 MG: 40 TABLET, DELAYED RELEASE ORAL at 07:09

## 2021-04-05 RX ADMIN — DOCUSATE SODIUM 50 MG AND SENNOSIDES 8.6 MG 1 TABLET: 8.6; 5 TABLET, FILM COATED ORAL at 09:30

## 2021-04-05 RX ADMIN — AMIODARONE HYDROCHLORIDE 400 MG: 200 TABLET ORAL at 09:30

## 2021-04-05 RX ADMIN — POTASSIUM CHLORIDE 20 MEQ: 750 TABLET, FILM COATED, EXTENDED RELEASE ORAL at 09:30

## 2021-04-05 RX ADMIN — FUROSEMIDE 40 MG: 40 TABLET ORAL at 09:30

## 2021-04-05 RX ADMIN — ATORVASTATIN CALCIUM 40 MG: 40 TABLET, FILM COATED ORAL at 09:30

## 2021-04-05 NOTE — PROGRESS NOTES
0730: Bedside and Verbal shift change report given to [facility] (oncoming nurse) by Jeffery Velásquez (offgoing nurse). Report included the following information SBAR, Kardex, Intake/Output, MAR, Accordion, Recent Results and Cardiac Rhythm NSR.     1200: IV and tele removed for discharge    1255: Discharge medications reviewed with patient and appropriate educational materials and side effects teaching were provided. I have reviewed discharge instructions with the patient. The patient verbalized understanding and has no further questions at this time. Patient discharged with sister.  Paperwork signed via e-sign

## 2021-04-05 NOTE — DISCHARGE INSTRUCTIONS
Cardiac Surgery Specialist    [address] [place] [address] [place] [address]  Office- [phone]  Fax- [phone]       Office- [phone]  Fax- [phone]  _____________________________________________________________  Dr. Augustin Curtis, Uziel Plasencia, NP  Dr. Priya Sim, NP          Yves Drake, NP  Dr. Neva Garcia, NP          Sam Cash, ISABELLA Wilson, ISABELLA Fagan, ISABELLA  _____________________________________________________________    Chuck Handing     Surgery & Date: Procedure(s):  CABG X 3 WITH CPB: DONOR SITES: LIMA AND RIGHT SAPHENOUS VEIN VIA EVH; YOJANA AND EPIAORTIC BY DR. Kristan Carpio    Discharge Date:  04/05/21     MEDICATIONS:  Please refer to your After Visit Summary for your medication list. If you do not have a prescription for a new medication, you may purchase the medication over the counter. DO NOT TAKE ANY MEDICATIONS THAT ARE NOT ON THIS LIST    INSTRUCTIONS:  1. NO SMOKING OR TOBACCO PRODUCTS  2. Follow all the instructions in your discharge book  3. You may shower. Wash all incisions twice daily with mild soap and water. No lotions, ointments or powder. 4. Call the office immediately for any redness, swelling, or drainage from your incision. 5. Take your temperature daily and call for a temperature of 101 degrees or higher or for any symptoms that make you think you have and infection. 6. Weigh yourself each morning. Call if you gain more than 5 pounds in 48 hours. 7. Use the incentive spirometer 6-8 times a day-10 breaths each time. Use a pillow or your bear to splint your breastbone when coughing or sneezing.   8. If you feel your breast bone clicking or popping, notify the office immediately. 9. Walk several hundred feet several times daily. DIET  Eat an American Heart Association diet. If you are having trouble with your appetite, eat what you can. Try eating small, frequent meals throughout the day. ACTIVITY  1. NO DRIVING--you will be evaluated to drive at your follow up visit. 2. Increase your activity by walking several times a day. Stay out of bed most of the day. 3. When sitting, keep your legs elevated. 4. You may ride in a car, but you must get out every hour and walk around. If you ride in a car with an airbag that can not be switched off, put the seat ALL the way back or ride in the back seat. 5. Any load bearing activity can be performed as long as it can be performed \"in the tube\". You can reach \"out of the tube\" when performing activities that don't require heavy lifting. Let pain be your guide, your pain level will keep you from doing anything extreme. FOLLOW UP  1. Your first follow up appointment will be on 4/9 at [phone] by telephone. Our office is located in [address] [place] [address]. Your second follow up appointment will be in four weeks, on 5/3 at 1:45pm in the clinic. Please call our office at [phone] if you are unable to make either one of these appointments. 2. You will be receiving a call before your 5 day appointment to begin cardiac rehab. They are programs located at [address] [place] [address], [address] [place] [address] and [facility].  The contact information is located in your Cardiac Surgery booklet. Please call if you have not been contacted 2-3 weeks after discharge from the hospital.  3. We will make an appointment with your cardiologist at your last appointment. 4. Consult you primary care physician regarding your influenza &   pneumovax vaccines. 5.   Please bring all medications with you to your appointment.     Signature:___________________________________________________

## 2021-04-05 NOTE — PROGRESS NOTES
CSS Progress Note    Admit Date: 3/26/2021  POD:  6 Day Post-Op    Procedure:  Procedure(s):  CABG X 3 WITH CPB: DONOR SITES: LIMA AND RIGHT SAPHENOUS VEIN VIA EVH; YOJANA AND EPIAORTIC BY DR. Severiano Sarks        Subjective:   Pt seen with Dr. Gerald Dowd. Afebrile, room air. Up in the chair. Wants to go home. Objective:   Vitals:  Blood pressure 114/65, pulse 85, temperature 98.6 °F (37 °C), resp. rate 15, height 5' 4\" (1.626 m), weight 156 lb 15.5 oz (71.2 kg), SpO2 94 %. Temp (24hrs), Av.3 °F (36.8 °C), Min:97.9 °F (36.6 °C), Max:98.6 °F (37 °C)    EKG/Rhythm: SR in the 80s    Oxygen Therapy:  Oxygen Therapy  O2 Sat (%): 94 % (21 07)  Pulse via Oximetry: 73 beats per minute (21)  O2 Device: Room air (21 0708)  Skin Assessment: Clean, dry, & intact (21 0800)  O2 Flow Rate (L/min): 0.5 l/min (21)  FIO2 (%): 50 % (21)    CXR:  CXR Results  (Last 48 hours)               21 0501  XR CHEST PORT Final result    Impression:  1. No significant change in the appearance of the chest.                  Narrative:  INDICATION: . post op heart   Additional history:   COMPARISON: Previous chest xray, yesterday. LIMITATIONS: Portable technique. Clotilde Abebe FINDINGS: Single frontal view of the chest.    .   Lines/tubes/surgical: Cardiac monitor leads overly the patient. Heart/mediastinum: Status post median sternotomy with vascular graft markers. Lungs/pleura: Minimal left basilar opacity with obscured left hemidiaphragm. Minimal opacity in the medial aspect of the right lung base. Minimal linear   opacity in the lateral aspect of the right midlung. No visible pneumothorax   Additional Comments: None. Clotilde Abebe 21 7446  XR CHEST PORT Final result    Impression:  Bibasilar subsegmental atelectasis.        Narrative:  EXAM: XR CHEST PORT       INDICATION: post op heart       COMPARISON: Prior day       FINDINGS: A portable AP radiograph of the chest was obtained at 0417 hours. The   patient is on a cardiac monitor. There are linear bands at both lung bases. Sternotomy wires are intact. The cardiac and mediastinal contours and pulmonary   vascularity are normal.  The bones and soft tissues are grossly within normal   limits. 04/03/21 1033  XR CHEST PORT Final result    Impression:  No significant interval change. Narrative:  EXAM: XR CHEST PORT       HISTORY: shortness of breath. COMPARISON: 4/2/2021       FINDINGS: Single view(s) of the chest. The patient is status post median   sternotomy and CABG. The lungs are underinflated. Mild bibasilar atelectasis is   again seen. No significant pleural effusion. No pneumothorax. The heart is   mildly enlarged, but unchanged. The visualized osseous structures are   unremarkable. Surgical clips are seen in the upper abdomen. Admission Weight: Last Weight   Weight: 162 lb 7.7 oz (73.7 kg) Weight: 156 lb 15.5 oz (71.2 kg)     Intake / Output / Drain:  Current Shift: 04/05 0701 - 04/05 1900  In: -   Out: 225 [Urine:225]  Last 24 hrs.:     Intake/Output Summary (Last 24 hours) at 4/5/2021 0857  Last data filed at 4/5/2021 0708  Gross per 24 hour   Intake 120 ml   Output 1100 ml   Net -980 ml       EXAM:  General:  No acute distress   Lungs:   Decreased in the bases   Incision:  No significant erythema, drainage, or dehiscence   Heart:  Regular rate and rhythm, S1, S2 normal, no murmur, click, rub or gallop. Abdomen:   Soft, non-tender. Bowel sounds normal. No masses,  No organomegaly. +flatus, BM+ 4/2   Extremities:  No edema. PPP. Neurologic:  Gross motor and sensory apparatus intact.      Labs:   Recent Labs     04/05/21  0504   WBC 7.5   HGB 9.8*   HCT 29.7*      *   K 3.7   BUN 21*   CREA 0.78   GLU 86        Assessment:     Active Problems:    CAD (coronary artery disease) (3/25/2021)      HLD (hyperlipidemia) (3/26/2021)      S/P CABG x 3 (3/30/2021) Plan/Recommendations/Medical Decision Makin. CAD 2VD s/p CABG with Dr. Caryl Triplett 3/30. On ASA, Statin, BB.    2. Acute systolic dysfunction, NYHA class II: LVEF 45%. Continue BB. Cont PO lasix. No ACEi/ARB, AA yet due to borderline BP. Will start GDTM postop as appropriate. 3. HLD: continue lipitor     4. Atelectasis:  IS and TCDB. Increase activity. Wean oxygen to keep sat >92%. Cont PO lasix. 5. Anemia: Postoperative secondary to acute blood loss. Currently above transfusion threshold. Continue iron. Monitor daily CBC. 6. Thrombocytopenia: improved. Switched Pepcid to PPI. Continue to monitor.     Dispo: PT/OT/Cardiac Rehab. Case Management for discharge planning. Home today.     Signed By: Alexandre Staples NP

## 2021-04-05 NOTE — DISCHARGE SUMMARY
[facility] Discharge Summary     Patient ID:  Casper Dick  [id number]  72 y.o.  1955    Admit date: 3/26/2021    Discharge date: 4/5/2021     Admitting Physician: Bean Mcdaniel MD     Referring Cardiologist:  Dr. Chen Sethi    PCP:  Kasi Carey NP     Admitting Diagnoses: CAD    Discharge Diagnoses: CAD s/p CABG     Hospital Problems  Never Reviewed          Codes Class Noted POA    CAD (coronary artery disease) ICD-10-CM: I25.10  ICD-9-CM: 414.00  3/25/2021 Unknown        S/P CABG x 3 ICD-10-CM: Z95.1  ICD-9-CM: V45.81  3/30/2021 Unknown        HLD (hyperlipidemia) ICD-10-CM: E78.5  ICD-9-CM: 272.4  3/26/2021 Unknown              Discharged Condition: stable    Disposition: home, see patient instructions for treatment and plan    Procedures for this admission:  Procedure(s):  CABG X 3 WITH CPB: DONOR SITES: LIMA AND RIGHT SAPHENOUS VEIN VIA EVH; YOJANA AND EPIAORTIC BY DR. Ed Pride    Discharge Medications:      My Medications      START taking these medications      Instructions Each Dose to Equal Morning Noon Evening Bedtime   amiodarone 200 mg tablet  Commonly known as: CORDARONE    Your last dose was: Your next dose is: Take 400 mg twice daily X 2 weeks then 400 mg daily X 2 weeks then stop                  furosemide 40 mg tablet  Commonly known as: LASIX    Your last dose was: Your next dose is: Take 1 Tab by mouth daily for 5 days. 40 mg                 potassium chloride SR 20 mEq tablet  Commonly known as: K-TAB    Your last dose was: Your next dose is: Take 1 Tab by mouth daily for 5 days. 20 mEq                 senna-docusate 8.6-50 mg per tablet  Commonly known as: PERICOLACE    Your last dose was: Your next dose is: Take 1 Tab by mouth two (2) times a day.    1 Tab                    CHANGE how you take these medications      Instructions Each Dose to Equal Morning Noon Evening Bedtime   metoprolol tartrate 25 mg tablet  Commonly known as: LOPRESSOR  What changed: how much to take    Your last dose was: Your next dose is: Take 0.5 Tabs by mouth two (2) times a day for 90 days. 12.5 mg                    CONTINUE taking these medications      Instructions Each Dose to Equal Morning Noon Evening Bedtime   aspirin 81 mg chewable tablet    Your last dose was: Your next dose is: Take 81 mg by mouth daily. 81 mg                 atorvastatin 20 mg tablet  Commonly known as: LIPITOR    Your last dose was: Your next dose is: Take 40 mg by mouth daily. 40 mg                 therapeutic multivitamin tablet  Commonly known as: An Marco Schütt 53 last dose was: Your next dose is: Take 1 Tab by mouth daily. 1 Tab                       Where to Get Your Medications      These medications were sent to [facility] [email or web address], [address] [facility] [place] [place]    Phone: [phone]   · amiodarone 200 mg tablet  · furosemide 40 mg tablet  · metoprolol tartrate 25 mg tablet  · potassium chloride SR 20 mEq tablet           HPI:  Grey Small is a 76 y.o.  female who was referred for cardiac evaluation of CAD by Dr. Fany Nieto. Pt has a significant PMH of HLD and benign murmur since a teenager. Pt has recently moved from [place] to [place] following the death of her . She began walking with her sister in law and noticed SOB with once they got up to walking 1 mile. She decided to be seen by PCP Concha Dick NP who referred her for a stress scho. Pt followed up with Dr. Fany Nieto after an abnormal stress echo on 3/17/21. She was started on Metoprolol and lipitor was increased to 40mg following her ECHO. She denies CP, but was positive for SOB and palpitations. She proceeded with cardiac catheterization with Dr. Fany Nieto on 3/25/21, was found to have multivessel disease and our team was consulted.  She denies CP, SOB, dizziness, PND, orthopnea, edema, or claudication at this time.       Pt retired. She is a recent . She moved in with her bother and sister in law here. She has lost 3 close family members in the last year, including her  and oldest daughter. She has never smoked, denies alcohol or illicit drug use.         Hospital Course: Les Torres was taken to the OR on 3/30/21 for a Coronary Artery Bypass Grafting X 3 (LIMA-LAD, SVG-OM, SVG-PDA) with Dr. Madisyn Cochran. She was taken from the OR to the CVICU in stable condition on Precedex drip. Postoperatively she did well. She worked with PT/OT and Cardiac Rehab to increase her activity and learn more about postoperative care. On POD #6, she was felt stable for discharge and was discharged to home with her family and home health with the following assessment and plan:    1. CAD 2VD s/p CABG with Dr. Madisyn Cochran 3/30. On ASA, Statin, BB.     2. Acute systolic dysfunction, NYHA class II: LVEF 45%.  Continue BB. Cont PO lasix. No ACEi/ARB, AA yet due to borderline BP. Will start GDTM postop as appropriate.      3.  HLD: continue lipitor      4. Atelectasis:  IS and TCDB. Increase activity. Wean oxygen to keep sat >92%. Cont PO lasix.      5. Anemia: Postoperative secondary to acute blood loss. Currently above transfusion threshold. Continue iron. Monitor daily CBC.     6. Thrombocytopenia: improved. Switched Pepcid to PPI. Continue to monitor.        Referral to outpatient cardiac rehab made. Discharge Vital Signs:   Visit Vitals  /65 (BP 1 Location: Left upper arm, BP Patient Position: Sitting)   Pulse 85   Temp 98.6 °F (37 °C)   Resp 15   Ht 5' 4\" (1.626 m)   Wt 156 lb 15.5 oz (71.2 kg)   SpO2 94%   BMI 26.94 kg/m²       Labs:   Recent Labs     04/05/21  0504   WBC 7.5   HGB 9.8*   HCT 29.7*      *   K 3.7   BUN 21*   CREA 0.78   GLU 86       Diagnostics:   CXR Results  (Last 48 hours)               04/05/21 0501  XR CHEST PORT Final result    Impression:  1.  No significant change in the appearance of the chest.                  Narrative:  INDICATION: . post op heart   Additional history:   COMPARISON: Previous chest xray, yesterday. LIMITATIONS: Portable technique. Ritu Gutierres FINDINGS: Single frontal view of the chest.    .   Lines/tubes/surgical: Cardiac monitor leads overly the patient. Heart/mediastinum: Status post median sternotomy with vascular graft markers. Lungs/pleura: Minimal left basilar opacity with obscured left hemidiaphragm. Minimal opacity in the medial aspect of the right lung base. Minimal linear   opacity in the lateral aspect of the right midlung. No visible pneumothorax   Additional Comments: None. Rtiu Gutierres 04/04/21 0414  XR CHEST PORT Final result    Impression:  Bibasilar subsegmental atelectasis. Narrative:  EXAM: XR CHEST PORT       INDICATION: post op heart       COMPARISON: Prior day       FINDINGS: A portable AP radiograph of the chest was obtained at 0417 hours. The   patient is on a cardiac monitor. There are linear bands at both lung bases. Sternotomy wires are intact. The cardiac and mediastinal contours and pulmonary   vascularity are normal.  The bones and soft tissues are grossly within normal   limits. 04/03/21 1033  XR CHEST PORT Final result    Impression:  No significant interval change. Narrative:  EXAM: XR CHEST PORT       HISTORY: shortness of breath. COMPARISON: 4/2/2021       FINDINGS: Single view(s) of the chest. The patient is status post median   sternotomy and CABG. The lungs are underinflated. Mild bibasilar atelectasis is   again seen. No significant pleural effusion. No pneumothorax. The heart is   mildly enlarged, but unchanged. The visualized osseous structures are   unremarkable. Surgical clips are seen in the upper abdomen. Patient Instructions/Follow Up Care:  Discharge instructions were reviewed with the patient and family present.  Questions were also answered at this time. Prescriptions and medications were reviewed. The patient has a follow up appointment with the Nurse Practitioner or Physician's Assistant on 4/9 at 11:45 am by telephone and with Dr. Cirilo Henderson on 5/3/21 at 1:45 pm in the clinic. The patient was also instructed to follow up with her primary care physician as needed. The patient and family were encouraged to call with any questions or concerns.        Signed:  Hasmukh Little NP  4/5/2021  9:05 AM

## 2021-04-05 NOTE — PROGRESS NOTES
Transitions of Care Plan:  RUR:  16%  Clinical Plan: Discharge Today  Consults: None  Baseline: Independent without DME; resides with family  Disposition: Home with home health - [facility] accepted    CM updated by [facility] NP that patient will discharge home today. CM updated [facility] of patient's discharge today. CM requested [facility] NP to release orders. Orders released to view. Medicare pt has received, reviewed, and signed 2nd IM letter informing them of their right to appeal the discharge. Signed copied has been placed on pt bedside chart.     Gayle Sal, MPH  Care Manager [facility]  Available via [employer] or

## 2021-04-06 ENCOUNTER — HOME CARE VISIT (OUTPATIENT)
Dept: SCHEDULING | Facility: HOME HEALTH | Age: 66
End: 2021-04-06
Payer: MEDICARE

## 2021-04-06 ENCOUNTER — TELEPHONE (OUTPATIENT)
Dept: CARDIOLOGY CLINIC | Age: 66
End: 2021-04-06

## 2021-04-06 ENCOUNTER — TELEPHONE (OUTPATIENT)
Dept: CASE MANAGEMENT | Age: 66
End: 2021-04-06

## 2021-04-06 VITALS
BODY MASS INDEX: 26.29 KG/M2 | OXYGEN SATURATION: 97 % | SYSTOLIC BLOOD PRESSURE: 104 MMHG | HEART RATE: 70 BPM | DIASTOLIC BLOOD PRESSURE: 66 MMHG | TEMPERATURE: 98.3 F | HEIGHT: 64 IN | WEIGHT: 154 LBS

## 2021-04-06 PROCEDURE — 3331090002 HH PPS REVENUE DEBIT

## 2021-04-06 PROCEDURE — 3331090001 HH PPS REVENUE CREDIT

## 2021-04-06 PROCEDURE — 400018 HH-NO PAY CLAIM PROCEDURE

## 2021-04-06 PROCEDURE — 400013 HH SOC

## 2021-04-06 PROCEDURE — G0299 HHS/HOSPICE OF RN EA 15 MIN: HCPCS

## 2021-04-06 RX ORDER — POTASSIUM CHLORIDE 20MEQ/15ML
20 LIQUID (ML) ORAL DAILY
Qty: 75 ML | Refills: 0 | Status: SHIPPED | OUTPATIENT
Start: 2021-04-06 | End: 2021-04-11

## 2021-04-06 RX ORDER — AMOXICILLIN 250 MG
1 CAPSULE ORAL 2 TIMES DAILY
Qty: 28 TAB | Refills: 0 | Status: SHIPPED | OUTPATIENT
Start: 2021-04-06 | End: 2021-04-21

## 2021-04-06 NOTE — TELEPHONE ENCOUNTER
Cardiac Surgery Discharge - Follow up call placed to [facility]. Reviewed plan of care after discharge and encouraged [facility] to verbalize. Discussed precautions and reviewed medications, patient without questions regarding medications. Encouraged continued use of the incentive spirometer. Confirmed follow up appts and reinforced importance of wearing red reminder bracelet. [facility] is without questions or concerns.  Yani Alves RN

## 2021-04-07 PROCEDURE — 3331090001 HH PPS REVENUE CREDIT

## 2021-04-07 PROCEDURE — 3331090002 HH PPS REVENUE DEBIT

## 2021-04-08 ENCOUNTER — HOME CARE VISIT (OUTPATIENT)
Dept: SCHEDULING | Facility: HOME HEALTH | Age: 66
End: 2021-04-08
Payer: MEDICARE

## 2021-04-08 VITALS
RESPIRATION RATE: 18 BRPM | SYSTOLIC BLOOD PRESSURE: 112 MMHG | TEMPERATURE: 96.6 F | OXYGEN SATURATION: 93 % | WEIGHT: 154 LBS | DIASTOLIC BLOOD PRESSURE: 64 MMHG | BODY MASS INDEX: 26.43 KG/M2 | HEART RATE: 88 BPM

## 2021-04-08 PROCEDURE — 3331090001 HH PPS REVENUE CREDIT

## 2021-04-08 PROCEDURE — 3331090002 HH PPS REVENUE DEBIT

## 2021-04-08 PROCEDURE — G0300 HHS/HOSPICE OF LPN EA 15 MIN: HCPCS

## 2021-04-09 ENCOUNTER — HOME CARE VISIT (OUTPATIENT)
Dept: SCHEDULING | Facility: HOME HEALTH | Age: 66
End: 2021-04-09
Payer: MEDICARE

## 2021-04-09 ENCOUNTER — OFFICE VISIT (OUTPATIENT)
Dept: CARDIOLOGY CLINIC | Age: 66
End: 2021-04-09
Payer: MEDICARE

## 2021-04-09 VITALS
WEIGHT: 153 LBS | BODY MASS INDEX: 26.26 KG/M2 | SYSTOLIC BLOOD PRESSURE: 98 MMHG | RESPIRATION RATE: 18 BRPM | OXYGEN SATURATION: 97 % | TEMPERATURE: 98.7 F | DIASTOLIC BLOOD PRESSURE: 60 MMHG | HEART RATE: 77 BPM

## 2021-04-09 DIAGNOSIS — I25.119 CORONARY ARTERY DISEASE INVOLVING NATIVE HEART WITH ANGINA PECTORIS, UNSPECIFIED VESSEL OR LESION TYPE (HCC): Primary | ICD-10-CM

## 2021-04-09 DIAGNOSIS — Z95.1 S/P CABG X 3: ICD-10-CM

## 2021-04-09 PROCEDURE — 99024 POSTOP FOLLOW-UP VISIT: CPT | Performed by: NURSE PRACTITIONER

## 2021-04-09 PROCEDURE — G0300 HHS/HOSPICE OF LPN EA 15 MIN: HCPCS

## 2021-04-09 PROCEDURE — 3331090002 HH PPS REVENUE DEBIT

## 2021-04-09 PROCEDURE — 3331090001 HH PPS REVENUE CREDIT

## 2021-04-09 RX ORDER — SERTRALINE HYDROCHLORIDE 25 MG/1
25 TABLET, FILM COATED ORAL
Qty: 90 TAB | Refills: 0 | Status: SHIPPED | OUTPATIENT
Start: 2021-04-09 | End: 2021-05-03 | Stop reason: ALTCHOICE

## 2021-04-09 NOTE — PROGRESS NOTES
Patient: Anni Rob   Age: 76 y.o. Patient Care Team:  Juana Hyatt NP as PCP - General (Pediatric Medicine)  Antonia Ramirez MD (Cardiology)  Helene Clay MD (Cardiothoracic Surgery)    Diagnosis: The primary encounter diagnosis was Coronary artery disease involving native heart with angina pectoris, unspecified vessel or lesion type [facility]). A diagnosis of S/P CABG x 3 was also pertinent to this visit. Problem List:   Patient Active Problem List   Diagnosis Code    CAD (coronary artery disease) I25.10    Unstable angina (HCC) I20.0    HLD (hyperlipidemia) E78.5    S/P CABG x 3 Z95.1        This service was provided thru telehealth, between Osa Castleman, NP  at Cardiac Surgery Specialist's office and Anni Rob at their home. Date of Surgery: 3/30/21     Surgery: CABG X 3 with Dr. José Duenas    HPI: Anni Rob and her sister-in-law were available via telephone for her initial postoperative visit. She states that she has been doing well overall. She denies fever, chills, chest pain, palpitations, worsening shortness of breath or issues with her incisions healing. She is still feeling fatigued at times and has had a hard time sleeping at night. She has also been feeling a little depressed and would like to be started on Zoloft. She recently lost her  and daughter and this has all been a lot for her to process. She has been walking at least twice daily for 5 minutes at a time. She has continued to do her exercises and feels like she is doing a little more everyday. Her weight has been stable and her BP and HR were WNL when checked by [facility]. Current Medications:   Current Outpatient Medications   Medication Sig Dispense Refill    sertraline (ZOLOFT) 25 mg tablet Take 1 Tab by mouth nightly. 90 Tab 0    metoprolol tartrate (LOPRESSOR) 25 mg tablet Take 0.5 Tabs by mouth two (2) times a day for 90 days.  30 Tab 2    amiodarone (CORDARONE) 200 mg tablet Take 400 mg twice daily X 2 weeks then 400 mg daily X 2 weeks then stop 84 Tab 0    furosemide (LASIX) 40 mg tablet Take 1 Tab by mouth daily for 5 days. 5 Tab 0    therapeutic multivitamin (THERAGRAN) tablet Take 1 Tab by mouth daily.  atorvastatin (LIPITOR) 20 mg tablet Take 40 mg by mouth daily.  aspirin 81 mg chewable tablet Take 81 mg by mouth daily.  senna-docusate (PERICOLACE) 8.6-50 mg per tablet Take 1 Tab by mouth two (2) times a day for 14 days. 28 Tab 0    potassium chloride (KAON 10%) 20 mEq/15 mL solution Take 15 mL by mouth daily for 5 days. 75 mL 0       Vitals: There were no vitals taken for this visit. Allergies: has No Known Allergies. Physical Exam:  No physical exam performed due to telephone encounter. Assessment/Plan:   1. CAD 2VD s/p CABG with Dr. Jaiden Chvaez 3/30. On ASA, Statin, BB.     2. Acute systolic dysfunction, NYHA class II: LVEF 45%.  Continue BB. Cont PO lasix. No ACEi/ARB, AA yet due to borderline BP. Will start GDTM postop as appropriate.      3.  HLD: continue lipitor      4. Depression: Will start low dose Zoloft.  She is scheduled back in the clinic on 5/3 and will reassess at that time. I have asked her to schedule an appointment with her PCP in early June for continued management.       Pt is ready to start cardiac rehab. Rehab - Yes when hh is complete  Walking: Increase daily as able  Glucometer: n/a  Antibiotic card for valves: n/a    Time spent:  10 minutes spent on the telephone with the patient and her sister-in-law. This time is not inclusive on time spent on note preparation, diagnostic testing review, or coordination of care.

## 2021-04-10 ENCOUNTER — TELEPHONE (OUTPATIENT)
Dept: CARDIOLOGY CLINIC | Age: 66
End: 2021-04-10

## 2021-04-10 PROCEDURE — 3331090001 HH PPS REVENUE CREDIT

## 2021-04-10 PROCEDURE — 3331090002 HH PPS REVENUE DEBIT

## 2021-04-10 RX ORDER — ONDANSETRON 4 MG/1
4 TABLET, ORALLY DISINTEGRATING ORAL
Qty: 30 TAB | Refills: 0 | Status: SHIPPED | OUTPATIENT
Start: 2021-04-10 | End: 2021-04-21

## 2021-04-10 NOTE — TELEPHONE ENCOUNTER
Patient has been nauseated and having a hard time eating. She states that her bowels are moving. She notices it more after she has taken her medications. Stop Amiodarone. Rx for Zofran 4 mg Q 8 hours PRN nausea/vomiting. Patient verbalized understanding and agreed.     Hasmukh Little NP

## 2021-04-11 PROCEDURE — 3331090002 HH PPS REVENUE DEBIT

## 2021-04-11 PROCEDURE — 3331090001 HH PPS REVENUE CREDIT

## 2021-04-12 ENCOUNTER — HOME CARE VISIT (OUTPATIENT)
Dept: SCHEDULING | Facility: HOME HEALTH | Age: 66
End: 2021-04-12
Payer: MEDICARE

## 2021-04-12 PROCEDURE — G0300 HHS/HOSPICE OF LPN EA 15 MIN: HCPCS

## 2021-04-12 PROCEDURE — 3331090002 HH PPS REVENUE DEBIT

## 2021-04-12 PROCEDURE — 3331090001 HH PPS REVENUE CREDIT

## 2021-04-13 ENCOUNTER — TRANSCRIBE ORDER (OUTPATIENT)
Dept: CARDIAC REHAB | Age: 66
End: 2021-04-13

## 2021-04-13 VITALS
RESPIRATION RATE: 20 BRPM | TEMPERATURE: 97.9 F | HEART RATE: 68 BPM | SYSTOLIC BLOOD PRESSURE: 99 MMHG | OXYGEN SATURATION: 97 % | WEIGHT: 151.4 LBS | BODY MASS INDEX: 25.99 KG/M2 | DIASTOLIC BLOOD PRESSURE: 61 MMHG

## 2021-04-13 DIAGNOSIS — Z95.1 POSTSURGICAL AORTOCORONARY BYPASS STATUS: Primary | ICD-10-CM

## 2021-04-13 PROCEDURE — 3331090002 HH PPS REVENUE DEBIT

## 2021-04-13 PROCEDURE — 3331090001 HH PPS REVENUE CREDIT

## 2021-04-14 ENCOUNTER — HOME CARE VISIT (OUTPATIENT)
Dept: SCHEDULING | Facility: HOME HEALTH | Age: 66
End: 2021-04-14
Payer: MEDICARE

## 2021-04-14 PROCEDURE — 3331090001 HH PPS REVENUE CREDIT

## 2021-04-14 PROCEDURE — 3331090002 HH PPS REVENUE DEBIT

## 2021-04-14 PROCEDURE — G0300 HHS/HOSPICE OF LPN EA 15 MIN: HCPCS

## 2021-04-15 PROCEDURE — 3331090001 HH PPS REVENUE CREDIT

## 2021-04-15 PROCEDURE — 3331090002 HH PPS REVENUE DEBIT

## 2021-04-16 ENCOUNTER — HOME CARE VISIT (OUTPATIENT)
Dept: SCHEDULING | Facility: HOME HEALTH | Age: 66
End: 2021-04-16
Payer: MEDICARE

## 2021-04-16 VITALS
RESPIRATION RATE: 18 BRPM | DIASTOLIC BLOOD PRESSURE: 70 MMHG | WEIGHT: 152 LBS | HEART RATE: 67 BPM | TEMPERATURE: 96.5 F | OXYGEN SATURATION: 96 % | SYSTOLIC BLOOD PRESSURE: 112 MMHG | BODY MASS INDEX: 26.09 KG/M2

## 2021-04-16 PROCEDURE — 3331090001 HH PPS REVENUE CREDIT

## 2021-04-16 PROCEDURE — G0299 HHS/HOSPICE OF RN EA 15 MIN: HCPCS

## 2021-04-16 PROCEDURE — 3331090002 HH PPS REVENUE DEBIT

## 2021-04-16 NOTE — TELEPHONE ENCOUNTER
Thinks since beginning Zoloft last week she has had more trouble sleeping, was told this was side effect. Ok to stop since she has only been on about a week. Can try OTC meds to help sleep if needed. F/u with PCP as well.

## 2021-04-17 PROCEDURE — 3331090002 HH PPS REVENUE DEBIT

## 2021-04-17 PROCEDURE — 3331090001 HH PPS REVENUE CREDIT

## 2021-04-18 VITALS
HEART RATE: 65 BPM | DIASTOLIC BLOOD PRESSURE: 60 MMHG | WEIGHT: 152 LBS | RESPIRATION RATE: 20 BRPM | OXYGEN SATURATION: 98 % | BODY MASS INDEX: 26.09 KG/M2 | TEMPERATURE: 97.7 F | SYSTOLIC BLOOD PRESSURE: 99 MMHG

## 2021-04-18 PROCEDURE — 3331090001 HH PPS REVENUE CREDIT

## 2021-04-18 PROCEDURE — 3331090002 HH PPS REVENUE DEBIT

## 2021-04-19 PROCEDURE — 3331090001 HH PPS REVENUE CREDIT

## 2021-04-19 PROCEDURE — 3331090002 HH PPS REVENUE DEBIT

## 2021-04-21 ENCOUNTER — HOSPITAL ENCOUNTER (OUTPATIENT)
Dept: CARDIAC REHAB | Age: 66
Discharge: HOME | End: 2021-04-21
Payer: MEDICARE

## 2021-04-21 VITALS — HEIGHT: 64 IN | BODY MASS INDEX: 27.62 KG/M2 | WEIGHT: 161.8 LBS

## 2021-04-21 PROCEDURE — 93798 PHYS/QHP OP CAR RHAB W/ECG: CPT

## 2021-04-21 NOTE — CARDIO/PULMONARY
[facility] Cardiopulmonary Rehab Orientation: Met with Imani Memos" Caitlin Leonard, : 1955, for cardiac rehab orientation and exercise tolerance test today. Ms Caitlin Leonard is a 76year-old patient of Kostas Remy, S/P CABG X3 (3/30/21). LVEF 40-45%. Cardiac risk factors include: HLD, post-menopausal, and family hx. BMI 27.8. Pt has never smoked cigarettes. CAD risk factors were reviewed with patient. Pt resides in [place] with her brother and sister-in-law. She relocated from [facility] following her 's death earlier this year. 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 Pt raised 2 daughters. Her eldest daughter  2 years ago from liver & kidney disease. Pt's dgt had 4 children (ages 11, 9, 5 & 6), which pt and her  cared for until 's recent death. Depression score on PHQ9 was 10 and this is considered to represent moderate-major depression. The result was discussed with patient, who affirms score to be accurate. Pt reported her PCP is aware of her depression and Zoloft was tried; however, it increased pt's insomnia. Patient denied chest pain or SOB during 6 minute exercise tolerance test on treadmill at 2.2 mph, with peak HR 90, peak /88, and RPE 13. Cardiac rhythm was SB/SR with isolated PVC. Limitations to exercise are primarily related to sternal precautions. Pt has been following her post-op instructions to the letter, with daily walking and stretches. Pt was given the Cardiac Rehab manual and an exercise plan was developed. Pt will attend cardiac rehab 3 times per week. She will continue her home walking program, 30-45 minutes daily.

## 2021-04-23 ENCOUNTER — HOSPITAL ENCOUNTER (OUTPATIENT)
Dept: CARDIAC REHAB | Age: 66
Discharge: HOME | End: 2021-04-23
Payer: MEDICARE

## 2021-04-23 VITALS — WEIGHT: 159.2 LBS | BODY MASS INDEX: 27.33 KG/M2

## 2021-04-23 PROCEDURE — 93798 PHYS/QHP OP CAR RHAB W/ECG: CPT

## 2021-04-26 ENCOUNTER — HOSPITAL ENCOUNTER (OUTPATIENT)
Dept: CARDIAC REHAB | Age: 66
Discharge: HOME | End: 2021-04-26
Payer: MEDICARE

## 2021-04-26 VITALS — BODY MASS INDEX: 27.29 KG/M2 | WEIGHT: 159 LBS

## 2021-04-26 PROCEDURE — 93798 PHYS/QHP OP CAR RHAB W/ECG: CPT

## 2021-04-28 ENCOUNTER — HOSPITAL ENCOUNTER (OUTPATIENT)
Dept: CARDIAC REHAB | Age: 66
Discharge: HOME | End: 2021-04-28
Payer: MEDICARE

## 2021-04-28 VITALS — BODY MASS INDEX: 27.46 KG/M2 | WEIGHT: 160 LBS

## 2021-04-28 PROCEDURE — 93797 PHYS/QHP OP CAR RHAB WO ECG: CPT

## 2021-04-28 PROCEDURE — 93798 PHYS/QHP OP CAR RHAB W/ECG: CPT

## 2021-04-30 ENCOUNTER — HOSPITAL ENCOUNTER (OUTPATIENT)
Dept: CARDIAC REHAB | Age: 66
Discharge: HOME | End: 2021-04-30
Payer: MEDICARE

## 2021-04-30 VITALS — WEIGHT: 159 LBS | BODY MASS INDEX: 27.29 KG/M2

## 2021-04-30 PROCEDURE — 93798 PHYS/QHP OP CAR RHAB W/ECG: CPT

## 2021-05-03 ENCOUNTER — OFFICE VISIT (OUTPATIENT)
Dept: CARDIOLOGY CLINIC | Age: 66
End: 2021-05-03
Payer: MEDICARE

## 2021-05-03 ENCOUNTER — HOSPITAL ENCOUNTER (OUTPATIENT)
Dept: CARDIAC REHAB | Age: 66
Discharge: HOME | End: 2021-05-03
Payer: MEDICARE

## 2021-05-03 VITALS
TEMPERATURE: 98.2 F | DIASTOLIC BLOOD PRESSURE: 72 MMHG | OXYGEN SATURATION: 96 % | RESPIRATION RATE: 12 BRPM | SYSTOLIC BLOOD PRESSURE: 112 MMHG | HEART RATE: 62 BPM

## 2021-05-03 VITALS — WEIGHT: 159.2 LBS | BODY MASS INDEX: 27.33 KG/M2

## 2021-05-03 DIAGNOSIS — Z95.1 S/P CABG X 3: Primary | ICD-10-CM

## 2021-05-03 PROCEDURE — 93798 PHYS/QHP OP CAR RHAB W/ECG: CPT

## 2021-05-03 PROCEDURE — 99024 POSTOP FOLLOW-UP VISIT: CPT | Performed by: NURSE PRACTITIONER

## 2021-05-03 NOTE — PROGRESS NOTES
Patient: Luis Kaur   Age: 76 y.o. Patient Care Team:  Lillie Gonsales NP as PCP - General (Pediatric Medicine)  Karol Lawson MD (Cardiology)  Juan Manuel Maher MD (Cardiothoracic Surgery)    Diagnosis: The encounter diagnosis was S/P CABG x 3. Problem List:   Patient Active Problem List   Diagnosis Code    CAD (coronary artery disease) I25.10    Unstable angina (HCC) I20.0    HLD (hyperlipidemia) E78.5    S/P CABG x 3 Z95.1        Date of Surgery: 3/30/21     Surgery:  Coronary Artery Bypass Grafting X 3 (LIMA-LAD, SVG-OM, SVG-PDA)    HPI:  Pt is here for post op follow up, seen with Dr. Claudia Jara. She has been doing well. Increasing he activity. Doing well in cardiac rehab. Appetite has improved. She is still struggling with sleep, instructed she is able to take benedryl. Reports melatonin is not working, she felt that it was actually keeping her awake. Current Medications:   Current Outpatient Medications   Medication Sig Dispense Refill    diphenhydrAMINE (Benadryl Allergy) 25 mg tablet Take 50 mg by mouth nightly as needed for Sleep.  metoprolol tartrate (LOPRESSOR) 25 mg tablet Take 0.5 Tabs by mouth two (2) times a day for 90 days. 30 Tab 2    therapeutic multivitamin (THERAGRAN) tablet Take 1 Tab by mouth daily.  atorvastatin (LIPITOR) 20 mg tablet Take 40 mg by mouth daily.  aspirin 81 mg chewable tablet Take 81 mg by mouth daily. Vitals: Blood pressure 112/72, pulse 62, temperature 98.2 °F (36.8 °C), resp. rate 12, SpO2 96 %. Allergies: has No Known Allergies. Physical Exam:  Wounds: healed    Lungs: clear to auscultation bilaterally    Heart: regular rate and rhythm, S1, S2 normal, no murmur, click, rub or gallop    Extremities: no edema     Assessment/Plan:   1. CAD 2VD s/p CABG with Dr. Kaylee Andrea 3/30. On ASA, Statin, BB.     2. Acute systolic dysfunction, NYHA class II: LVEF 45%.  Continue BB. Cont PO lasix.  No ACEi/ARB, AA yet due to borderline BP. Will start GDTM postop as appropriate.      3.  HLD: continue lipitor      4. Depression: Stopped Zoloft. Doing well at this time.  Will FU with her PCP    She has a FU appt with Cardiology this Friday.      Rehab - yes, already attending  Walking: yes  Glucometer: yes

## 2021-05-05 ENCOUNTER — HOSPITAL ENCOUNTER (OUTPATIENT)
Dept: CARDIAC REHAB | Age: 66
Discharge: HOME | End: 2021-05-05
Payer: MEDICARE

## 2021-05-05 VITALS — BODY MASS INDEX: 27.29 KG/M2 | WEIGHT: 159 LBS

## 2021-05-05 PROCEDURE — 93798 PHYS/QHP OP CAR RHAB W/ECG: CPT

## 2021-05-05 PROCEDURE — 93797 PHYS/QHP OP CAR RHAB WO ECG: CPT

## 2021-05-07 ENCOUNTER — HOSPITAL ENCOUNTER (OUTPATIENT)
Dept: CARDIAC REHAB | Age: 66
Discharge: HOME | End: 2021-05-07
Payer: MEDICARE

## 2021-05-07 VITALS — BODY MASS INDEX: 27.29 KG/M2 | WEIGHT: 159 LBS

## 2021-05-07 PROCEDURE — 93798 PHYS/QHP OP CAR RHAB W/ECG: CPT

## 2021-05-10 ENCOUNTER — HOSPITAL ENCOUNTER (OUTPATIENT)
Dept: CARDIAC REHAB | Age: 66
Discharge: HOME | End: 2021-05-10
Payer: MEDICARE

## 2021-05-10 VITALS — BODY MASS INDEX: 27.29 KG/M2 | WEIGHT: 159 LBS

## 2021-05-10 PROCEDURE — 93798 PHYS/QHP OP CAR RHAB W/ECG: CPT

## 2021-05-12 ENCOUNTER — HOSPITAL ENCOUNTER (OUTPATIENT)
Dept: CARDIAC REHAB | Age: 66
Discharge: HOME | End: 2021-05-12
Payer: MEDICARE

## 2021-05-12 VITALS — WEIGHT: 159 LBS | BODY MASS INDEX: 27.29 KG/M2

## 2021-05-12 PROCEDURE — 93797 PHYS/QHP OP CAR RHAB WO ECG: CPT

## 2021-05-12 PROCEDURE — 93798 PHYS/QHP OP CAR RHAB W/ECG: CPT

## 2021-05-14 ENCOUNTER — HOSPITAL ENCOUNTER (OUTPATIENT)
Dept: CARDIAC REHAB | Age: 66
Discharge: HOME | End: 2021-05-14
Payer: MEDICARE

## 2021-05-14 VITALS — BODY MASS INDEX: 27.29 KG/M2 | WEIGHT: 159 LBS

## 2021-05-14 PROCEDURE — 93798 PHYS/QHP OP CAR RHAB W/ECG: CPT

## 2021-05-17 ENCOUNTER — HOSPITAL ENCOUNTER (OUTPATIENT)
Dept: CARDIAC REHAB | Age: 66
Discharge: HOME | End: 2021-05-17
Payer: MEDICARE

## 2021-05-17 VITALS — WEIGHT: 157.5 LBS | BODY MASS INDEX: 27.03 KG/M2

## 2021-05-17 PROCEDURE — 93798 PHYS/QHP OP CAR RHAB W/ECG: CPT

## 2021-05-17 PROCEDURE — 93797 PHYS/QHP OP CAR RHAB WO ECG: CPT | Performed by: DIETITIAN, REGISTERED

## 2021-05-17 NOTE — CARDIO/PULMONARY
Cardiac Rehab Nutrition Assessment - 1:1 Evaluation           NAME: Evelia Carpio : 1955 AGE: 76 y.o. GENDER: female  CARDIAC REHAB ADMITTING DIAGNOSIS: CABG (3/30/21)    Relevant Comorbidites: dyslipidemia, cardiovascular disease and EF 40-45%        LABS:   Lab Results   Component Value Date/Time    Hemoglobin A1c 5.2 2021 05:12 AM     No results found for: CHOL, CHOLPOCT, CHOLX, CHLST, CHOLV, HDL, HDLPOC, HDLP, LDL, LDLCPOC, LDLC, DLDLP, VLDLC, VLDL, TGLX, TRIGL, TRIGP, TGLPOCT, CHHD, CHHDX    3/11/21 Total Chol 299, HDL 49, , Trig 108    MEDICATIONS/SUPPLEMENTS:   [unfilled]  Prior to Admission medications    Medication Sig Start Date End Date Taking? Authorizing Provider   diphenhydrAMINE (Benadryl Allergy) 25 mg tablet Take 50 mg by mouth nightly as needed for Sleep. 21   Candi Castillo MD   metoprolol tartrate (LOPRESSOR) 25 mg tablet Take 0.5 Tabs by mouth two (2) times a day for 90 days. 21  [facility]   therapeutic multivitamin [facility]) tablet Take 1 Tab by mouth daily. Provider, Historical   atorvastatin (LIPITOR) 20 mg tablet Take 40 mg by mouth daily. Provider, Historical   aspirin 81 mg chewable tablet Take 81 mg by mouth daily.     Provider, Historical           ANTHROPOMETRICS:    Ht Readings from Last 1 Encounters:   21 5' 4\" (1.626 m)      Wt Readings from Last 10 Encounters:   21 71.4 kg (157 lb 8 oz)   21 72.1 kg (159 lb)   21 72.1 kg (159 lb)   05/10/21 72.1 kg (159 lb)   21 72.1 kg (159 lb)   21 72.1 kg (159 lb)   21 72.2 kg (159 lb 3.2 oz)   21 72.1 kg (159 lb)   21 72.6 kg (160 lb)   21 72.1 kg (159 lb)      IBW:120 # +/- 10%  %IBW: 131 % +/- 10%    BMI: 27 kg/M2  Category: overweight  Waist: 37.5 inches    Reported Wt Hx:  167 lbs prior to CABG    Reported Diet Hx:    Rate Your Plate Score: 41  (Score 58-72: Making many healthy choices; 41-57: Some choices need improving 24-40: many choices need improving)    24 HOUR DIET RECALL  Breakfast    Lunch    Dinner    Snacks    Beverages      Casper Dick states since surgery she is not using salt and watching sodium carefully. They are reading labels for saturated fat. She is tracking her intake on [employer] now and this was reviewed in lieu of diet recall. Food diary indicates eating low in saturated fat. Calories are often 1000 or less. Rarely eats whole grains as dislikes these. Fruit intake is sporadic. Some meals are quite low in protein, eg. 1/2 English muffin with tub spread for breakfast.    Environmental/Social: \"Michelle\" was recently  and has moved in with her brother and his wife, her sister-in-law shares in the shopping & cooking and is with her today for this consult. Cem Trujillo retired in February; she exercises daily - walks 2 miles on days not exercising at cardiac rehab        NUTRITION INTERVENTION:  Nutrition 60 minute one-on-one education & goal setting with Casper Dick    Reviewed with Casper Dick relevant labs compared to ideals. Reviewed weight history and patient's verbalized weight goal as well as any real or perceived barriers to obtaining the goal. Collaborated with patient to set a specific short and long term weight goal.     Reviewed Rate Your Plate and conducted a verbal diet recall. Assessed for environmental, financial, psychosocial, physical and comorbidities that may impact the food and eating patterns / behaviors of Casper Stclaudio    Collaborated with patient to set specific nutrient goals as well as specific food / behavior changes that will help patient meet the overall goal of following a heart healthy eating pattern (using guidelines as set forth by the American Heart Association and modeled after healthful eating patterns as recognized by the USDA Dietary Guidelines such as DASH, Mediterranean or plant-based).     Briefly reviewed with Casper Dick the nutrition information in the Cardiac Rehab patient education book and encouraged Anca Naylor to read thoroughly, ask questions as needed, and use for future reference for heart healthy nutrition information. Anca Naylor is scheduled to participate in Cardiac Rehab group nutrition classes. PATIENT GOALS:    Weight Goals:  Short Term Weight Goal: 150-155 lbs  Long Term Weight Goal:145-149 lbs    Nutrition Goals:  Daily Recommendations:  Calories: 1200 /day  (using [place] Em with AF 1.3-1.35 and deducting 282-141 for weight loss)    Saturated Fat: no more than 8 g/day  Trans Fat: 0 g/day  Sodium: no more than 2876-9588 mg/day  Fruit: 1 cup / day  Vegetables: 1.5 or more cups/day    Other:  - read and compare food labels  - Eat oatmeal several times per week (recipes provided)  - Have protein at each meal; e.g. eat low/no fat Greek yogurt with breakfast (such as [employer] Triple Zero)      Keeping a food diary was recommended. Questions addressed. Follow-up plans discussed. Anca Naylor verbalized understanding.             Jaja Campos RD

## 2021-05-19 ENCOUNTER — HOSPITAL ENCOUNTER (OUTPATIENT)
Dept: CARDIAC REHAB | Age: 66
Discharge: HOME | End: 2021-05-19
Payer: MEDICARE

## 2021-05-19 VITALS — WEIGHT: 157.5 LBS | BODY MASS INDEX: 27.03 KG/M2

## 2021-05-19 PROCEDURE — 93798 PHYS/QHP OP CAR RHAB W/ECG: CPT

## 2021-05-21 ENCOUNTER — HOSPITAL ENCOUNTER (OUTPATIENT)
Dept: CARDIAC REHAB | Age: 66
Discharge: HOME | End: 2021-05-21
Payer: MEDICARE

## 2021-05-21 VITALS — WEIGHT: 156.5 LBS | BODY MASS INDEX: 26.86 KG/M2

## 2021-05-21 PROCEDURE — 93798 PHYS/QHP OP CAR RHAB W/ECG: CPT

## 2021-05-24 ENCOUNTER — HOSPITAL ENCOUNTER (OUTPATIENT)
Dept: CARDIAC REHAB | Age: 66
Discharge: HOME | End: 2021-05-24
Payer: MEDICARE

## 2021-05-24 PROCEDURE — 93798 PHYS/QHP OP CAR RHAB W/ECG: CPT

## 2021-05-26 ENCOUNTER — HOSPITAL ENCOUNTER (OUTPATIENT)
Dept: CARDIAC REHAB | Age: 66
Discharge: HOME | End: 2021-05-26
Payer: MEDICARE

## 2021-05-26 VITALS — BODY MASS INDEX: 26.86 KG/M2 | WEIGHT: 156.5 LBS

## 2021-05-26 PROCEDURE — 93798 PHYS/QHP OP CAR RHAB W/ECG: CPT

## 2021-05-28 ENCOUNTER — HOSPITAL ENCOUNTER (OUTPATIENT)
Dept: CARDIAC REHAB | Age: 66
Discharge: HOME | End: 2021-05-28
Payer: MEDICARE

## 2021-05-28 VITALS — WEIGHT: 157 LBS | BODY MASS INDEX: 26.95 KG/M2

## 2021-05-28 PROCEDURE — 93798 PHYS/QHP OP CAR RHAB W/ECG: CPT

## 2021-06-01 ENCOUNTER — HOSPITAL ENCOUNTER (OUTPATIENT)
Dept: CARDIAC REHAB | Age: 66
Discharge: HOME | End: 2021-06-01
Payer: MEDICARE

## 2021-06-01 VITALS — BODY MASS INDEX: 26.86 KG/M2 | WEIGHT: 156.5 LBS

## 2021-06-01 PROCEDURE — 93798 PHYS/QHP OP CAR RHAB W/ECG: CPT

## 2021-06-02 ENCOUNTER — APPOINTMENT (OUTPATIENT)
Dept: CARDIAC REHAB | Age: 66
End: 2021-06-02
Payer: MEDICARE

## 2021-06-03 ENCOUNTER — HOSPITAL ENCOUNTER (OUTPATIENT)
Dept: CARDIAC REHAB | Age: 66
Discharge: HOME | End: 2021-06-03
Payer: MEDICARE

## 2021-06-03 VITALS — WEIGHT: 155.5 LBS | BODY MASS INDEX: 26.69 KG/M2

## 2021-06-03 PROCEDURE — 93798 PHYS/QHP OP CAR RHAB W/ECG: CPT

## 2021-06-04 ENCOUNTER — HOSPITAL ENCOUNTER (OUTPATIENT)
Dept: CARDIAC REHAB | Age: 66
Discharge: HOME | End: 2021-06-04
Payer: MEDICARE

## 2021-06-04 VITALS — BODY MASS INDEX: 26.61 KG/M2 | WEIGHT: 155 LBS

## 2021-06-04 PROCEDURE — 93798 PHYS/QHP OP CAR RHAB W/ECG: CPT

## 2021-06-07 ENCOUNTER — HOSPITAL ENCOUNTER (OUTPATIENT)
Dept: CARDIAC REHAB | Age: 66
Discharge: HOME | End: 2021-06-07
Payer: MEDICARE

## 2021-06-07 VITALS — WEIGHT: 156 LBS | BODY MASS INDEX: 26.78 KG/M2

## 2021-06-07 PROCEDURE — 93798 PHYS/QHP OP CAR RHAB W/ECG: CPT

## 2021-06-08 ENCOUNTER — HOSPITAL ENCOUNTER (OUTPATIENT)
Dept: CARDIAC REHAB | Age: 66
Discharge: HOME | End: 2021-06-08
Payer: MEDICARE

## 2021-06-08 VITALS — WEIGHT: 155.5 LBS | BODY MASS INDEX: 26.69 KG/M2

## 2021-06-08 PROCEDURE — 93798 PHYS/QHP OP CAR RHAB W/ECG: CPT

## 2021-06-09 ENCOUNTER — HOSPITAL ENCOUNTER (OUTPATIENT)
Dept: CARDIAC REHAB | Age: 66
Discharge: HOME | End: 2021-06-09
Payer: MEDICARE

## 2021-06-09 VITALS — BODY MASS INDEX: 26.78 KG/M2 | WEIGHT: 156 LBS

## 2021-06-09 PROCEDURE — 93797 PHYS/QHP OP CAR RHAB WO ECG: CPT

## 2021-06-09 PROCEDURE — 93798 PHYS/QHP OP CAR RHAB W/ECG: CPT

## 2021-06-11 ENCOUNTER — HOSPITAL ENCOUNTER (OUTPATIENT)
Dept: CARDIAC REHAB | Age: 66
Discharge: HOME | End: 2021-06-11
Payer: MEDICARE

## 2021-06-11 VITALS — WEIGHT: 156 LBS | BODY MASS INDEX: 26.78 KG/M2

## 2021-06-11 PROCEDURE — 93798 PHYS/QHP OP CAR RHAB W/ECG: CPT

## 2021-06-14 ENCOUNTER — HOSPITAL ENCOUNTER (OUTPATIENT)
Dept: CARDIAC REHAB | Age: 66
Discharge: HOME | End: 2021-06-14
Payer: MEDICARE

## 2021-06-14 VITALS — WEIGHT: 156.5 LBS | BODY MASS INDEX: 26.86 KG/M2

## 2021-06-14 PROCEDURE — 93798 PHYS/QHP OP CAR RHAB W/ECG: CPT

## 2021-06-15 ENCOUNTER — HOSPITAL ENCOUNTER (OUTPATIENT)
Dept: CARDIAC REHAB | Age: 66
Discharge: HOME | End: 2021-06-15
Payer: MEDICARE

## 2021-06-15 VITALS — BODY MASS INDEX: 26.86 KG/M2 | WEIGHT: 156.5 LBS

## 2021-06-15 PROCEDURE — 93798 PHYS/QHP OP CAR RHAB W/ECG: CPT

## 2021-06-17 ENCOUNTER — HOSPITAL ENCOUNTER (OUTPATIENT)
Dept: CARDIAC REHAB | Age: 66
Discharge: HOME | End: 2021-06-17
Payer: MEDICARE

## 2021-06-17 VITALS — WEIGHT: 156 LBS | BODY MASS INDEX: 26.78 KG/M2

## 2021-06-17 PROCEDURE — 93798 PHYS/QHP OP CAR RHAB W/ECG: CPT

## 2021-06-18 ENCOUNTER — HOSPITAL ENCOUNTER (OUTPATIENT)
Dept: CARDIAC REHAB | Age: 66
Discharge: HOME | End: 2021-06-18
Payer: MEDICARE

## 2021-06-18 VITALS — WEIGHT: 156.5 LBS | BODY MASS INDEX: 26.86 KG/M2

## 2021-06-18 PROCEDURE — 93798 PHYS/QHP OP CAR RHAB W/ECG: CPT

## 2021-06-21 ENCOUNTER — HOSPITAL ENCOUNTER (OUTPATIENT)
Dept: CARDIAC REHAB | Age: 66
Discharge: HOME | End: 2021-06-21
Payer: MEDICARE

## 2021-06-21 VITALS — BODY MASS INDEX: 26.86 KG/M2 | WEIGHT: 156.5 LBS

## 2021-06-21 PROCEDURE — 93798 PHYS/QHP OP CAR RHAB W/ECG: CPT

## 2021-06-22 ENCOUNTER — HOSPITAL ENCOUNTER (OUTPATIENT)
Dept: CARDIAC REHAB | Age: 66
Discharge: HOME | End: 2021-06-22
Payer: MEDICARE

## 2021-06-22 VITALS — BODY MASS INDEX: 26.95 KG/M2 | WEIGHT: 157 LBS

## 2021-06-22 PROCEDURE — 93798 PHYS/QHP OP CAR RHAB W/ECG: CPT

## 2021-06-24 ENCOUNTER — HOSPITAL ENCOUNTER (OUTPATIENT)
Dept: CARDIAC REHAB | Age: 66
Discharge: HOME | End: 2021-06-24
Payer: MEDICARE

## 2021-06-24 VITALS — BODY MASS INDEX: 26.86 KG/M2 | WEIGHT: 156.5 LBS

## 2021-06-24 PROCEDURE — 93798 PHYS/QHP OP CAR RHAB W/ECG: CPT

## 2021-06-24 RX ORDER — ROSUVASTATIN CALCIUM 40 MG/1
40 TABLET, COATED ORAL
COMMUNITY

## 2021-06-25 ENCOUNTER — APPOINTMENT (OUTPATIENT)
Dept: CARDIAC REHAB | Age: 66
End: 2021-06-25
Payer: MEDICARE

## 2021-06-28 ENCOUNTER — APPOINTMENT (OUTPATIENT)
Dept: CARDIAC REHAB | Age: 66
End: 2021-06-28
Payer: MEDICARE

## 2021-06-29 ENCOUNTER — APPOINTMENT (OUTPATIENT)
Dept: CARDIAC REHAB | Age: 66
End: 2021-06-29
Payer: MEDICARE

## 2021-06-29 NOTE — CARDIO/PULMONARY
Mahsa Riley  Completed phase II cardiac rehab and attended 36sessions from 4/21/21 to 6/24/21 . Mahsa Riley is interested in maintaining optimal health and will work with Dr. Efrain Gagnon. Mahsa Riley has improved her endurance and stamina through regular exercise, lost 5 lbs. Blood pressure is 105/77 and is WNL. She has also improved her nutrition, [employer] and depression scores and these were reviewed with patient. Mahsa Riley plans to continue exercising at home,  and has set revised goals by using light weights and walking daily 55 minutes and up to 2.5 miles.   Chad Hernandez, RN  6/29/2021

## 2021-07-01 ENCOUNTER — APPOINTMENT (OUTPATIENT)
Dept: CARDIAC REHAB | Age: 66
End: 2021-07-01

## 2021-07-02 ENCOUNTER — APPOINTMENT (OUTPATIENT)
Dept: CARDIAC REHAB | Age: 66
End: 2021-07-02

## 2022-03-18 PROBLEM — Z95.1 S/P CABG X 3: Status: ACTIVE | Noted: 2021-03-30

## 2022-03-18 PROBLEM — I25.10 CAD (CORONARY ARTERY DISEASE): Status: ACTIVE | Noted: 2021-03-25

## 2022-03-18 PROBLEM — E78.5 HLD (HYPERLIPIDEMIA): Status: ACTIVE | Noted: 2021-03-26

## 2022-03-18 PROBLEM — I20.0 UNSTABLE ANGINA (HCC): Status: ACTIVE | Noted: 2021-03-25

## 2023-04-03 NOTE — PROGRESS NOTES
Error    Transitions of Care Plan:  RUR:  17%  Clinical Plan: post CABG recovery  Consults: Therapy  Baseline: independent without DME; resides with family  Disposition:  Home with home health - [facility] accepted    Patient transferred into CCU yesterday. Today is post-op day 2. Continues to improve with therapy. Anticipate home with home health once medically stable for discharge.     Allie Craven, MPH  Care Manager [facility]  Available via [employer] or

## 2023-05-14 RX ORDER — ASPIRIN 81 MG/1
81 TABLET, CHEWABLE ORAL DAILY
COMMUNITY

## 2023-05-14 RX ORDER — ROSUVASTATIN CALCIUM 40 MG/1
40 TABLET, COATED ORAL
COMMUNITY

## 2024-10-29 NOTE — PROGRESS NOTES
Problem: Mobility Impaired (Adult and Pediatric)  Goal: *Acute Goals and Plan of Care (Insert Text)  Description: FUNCTIONAL STATUS PRIOR TO ADMISSION: Patient was independent and active without use of DME. Patient recently retired from [employer] about 2 months ago. Patient is from [place], [place] however is now living in [place] with her brother/sister-in-law after the losses of her mother, daughter, and . HOME SUPPORT: Patient will be staying with her brother who can assist her as needed. Physical Therapy Goals  Initiated 3/31/2021  1. Patient will move from supine to sit and sit to supine , scoot up and down, and roll side to side in bed with modified independence within 5 days. 2.  Patient will perform sit to/from stand with modified independence within 5 days. 3.  Patient will ambulate 300 feet with least restrictive assistive device and independence within 5 days. 4.  Patient will ascend/descend 2 stairs with  handrail(s) with modified independence within 5 days. 5.  Patient will perform cardiac exercises per protocol with independence within 5 days. 6.  Patient will verbally recall and functionally demonstrate mindful-based movements (\"move in the tube\") principles without cues within 5 days. Outcome: Progressing Towards Goal     PHYSICAL THERAPY TREATMENT  Patient: Anca Naylor (30 y.o. female)  Date: 4/1/2021  Diagnosis: CAD (coronary artery disease) [I25.10] <principal problem not specified>  Procedure(s) (LRB):  CABG X 3 WITH CPB: DONOR SITES: LIMA AND RIGHT SAPHENOUS VEIN VIA EVH; YOJANA AND EPIAORTIC BY DR. Martita Issa (N/A) 2 Days Post-Op  Precautions: Fall(\"move in the tube\")  Chart, physical therapy assessment, plan of care and goals were reviewed. ASSESSMENT  Patient continues with skilled PT services and is progressing towards goals. She ambulated 125 ft with CGA d/t intermittent unsteadiness, tends to look down at her feet, corrects with cues provided.   Anticipate home with no therapy follow up and transition to cardiac rehab when appropriate. Patient is verbalizing and is demonstrating understanding of mindful-based movements (\"move in the tube\") principles of keeping UEs proximal to ribcage to prevent lateral pull on the sternum during load-bearing activities with verbal cues required for compliance. Current Level of Function Impacting Discharge (mobility/balance): Supervision sit<->stand; CGA ambulation    Other factors to consider for discharge: newly enforced sternal precautions; plof indep         PLAN :  Patient continues to benefit from skilled intervention to address the above impairments. Continue treatment per established plan of care. to address goals. Recommendation for discharge: (in order for the patient to meet his/her long term goals)  Physical therapy at least 2 days/week in the home     This discharge recommendation:  A follow-up discussion with the attending provider and/or case management is planned    IF patient discharges home will need the following DME: none       SUBJECTIVE:   Patient stated Maybe 20 mins.  (time to take her pills)    OBJECTIVE DATA SUMMARY:   Patient mobilized on continuous portable monitor/telemetry. Critical Behavior:  Neurologic State: Alert  Orientation Level: Oriented X4  Cognition: Appropriate decision making, Appropriate for age attention/concentration, Appropriate safety awareness  Safety/Judgement: Decreased insight into deficits    Functional Mobility Training:  Bed Mobility:  Received, remained sitting up                   Transfers:  Sit to Stand: Supervision  Stand to Sit: Supervision                               Balance:  Sitting: Intact; Without support  Standing: Intact; Without support  Standing - Static: Good  Standing - Dynamic : Good    Ambulation/Gait Training:  Distance (ft): 125 Feet (ft)     Ambulation - Level of Assistance: Contact guard assistance;Assist x1        Gait Abnormalities: Decreased step clearance; Other; Altered arm swing(head down/ looking at feet)        Base of Support: Widened     Speed/Staci: Slow;Pace decreased (<100 feet/min)  Step Length: Right shortened;Left shortened         Cardiac diagnosis intervention:  Patient instructed and educated on mindful movement principles based on Move in The Tube concept to include maintaining bilateral elbows close to rib cage when performing any load-bearing activity such as getting in/out of bed, pushing up from a chair, opening a door, or lifting a box. Patient has the handout with diagrams of each correct/incorrect method of performing each of the above tasks. Therapeutic Exercises:   Patient instructed on the benefits and demonstrated cardiac exercises while seated or standing with Supervision. Instructed and indicated understanding on how to progress reps, sets against gravity, pacing through progressive muscle strengthening standing based on surgeon clearance for more weight in prep for basic and instrumental ADLs. Instruction on the use of household items in place of weights as needed.      CARDIAC  EXERCISE   Sets   Reps   Active Active Assist   Passive Self ROM   Comments   Shoulder flexion 1 5 [x]                                            []                                            []                                            []                                            seated   Shoulder abduction 1      5 [x]                                            []                                            []                                            []                                            stand   Scapular elevation 1 5 [x]                                            []                                            []                                            []                                            seated   Scapular retraction 1 5 [x]                                            []                                            [] []                                            seated   Trunk rotation 1 5 [x]                                            []                                            []                                            []                                            stand   Trunk sidebending 1 5 [x]                                            []                                            []                                            []                                            stand      []                                            []                                            []                                            []                                                   Pain Rating:  None voiced    Activity Tolerance:   Fair and requires rest breaks    After treatment patient left in no apparent distress:   Sitting in chair and Call bell within reach    COMMUNICATION/COLLABORATION:   The patients plan of care was discussed with: Occupational therapist and Registered nurse.      Lynda Braun, PT   Time Calculation: 23 mins no

## 2025-04-07 NOTE — PROGRESS NOTES
1176: Verbal report received from April, RN at [address] [place] [address] unit. TRANSFER - IN REPORT:    Verbal report received from April (name) on Priya Ravi  being received from [address] [place] [address] (unit) for routine progression of care      Report consisted of patients Situation, Background, Assessment and   Recommendations(SBAR). Information from the following report(s) SBAR, Kardex, Procedure Summary, Intake/Output, MAR and Recent Results was reviewed with the receiving nurse. Opportunity for questions and clarification was provided. Assessment completed upon patients arrival to unit and care assumed. 1107: Pt arrived on unit via [facility] and placed on tele. Confirmed with monitor tech. 1329: Pt off monitor to xray without monitor or RN. Per MD order. 1404: Pt returned to unit and placed on tele. Confirmed with monitor tech. 1410: UA/UC results called to cardiac surgery team. No new orders. 1930: Bedside shift change report given to Lizz (oncoming nurse) by Marzena Moreland (offgoing nurse). Report included the following information SBAR, Kardex, Procedure Summary, Intake/Output, MAR and Recent Results. Pharmacy: need Ipledge id to run thru rems      Isotretinoin 20 MG Oral Cap, Take 1 capsule (20 mg total) by mouth daily., Disp: 30 capsule, Rfl: 0

## (undated) DEVICE — BANDAGE COMPR ELASTIC 5 YDX6 IN

## (undated) DEVICE — DRAIN,WOUND,ROUND,24FR,5/16",FULL-FLUTED: Brand: MEDLINE

## (undated) DEVICE — WRAP SURG W1.31XL1.34M CARD FOR PT 165-172CM THERMOWRP

## (undated) DEVICE — DRAPE PRB US TRNSDCR 6X96IN --

## (undated) DEVICE — KIT BLWR MISTER 5P 15L W/ TBNG SET IRRIG MIST TO IMPROVE

## (undated) DEVICE — SUTURE VCRL SZ 0 L18IN ABSRB VLT L40MM CT 1/2 CIR J752D

## (undated) DEVICE — SYS VSL HARV HEMOPRO2 VASOVIEW -- HARV SYS MINIMUM ORDER 5/EA

## (undated) DEVICE — SEALANT TISS 4 CC FIBRIN VISTASEAL

## (undated) DEVICE — BAG RED 3PLY 2MIL 30X40 IN

## (undated) DEVICE — NEEDLE ANGIO 18GA L9CM NRML 1 WALL SMOOTH FINISH CLR HUB FOR

## (undated) DEVICE — DRESSING SIL W4XL5IN ANTIBACT GELLING FBR CYTOFORM

## (undated) DEVICE — SOL INJ SOD CL 0.9% 500ML BG --

## (undated) DEVICE — LUER-LOK 360°: Brand: CONNECTA, LUER-LOK

## (undated) DEVICE — SUTURE PROL SZ 7-0 L24IN NONABSORBABLE BLU L8MM BV175-6 3/8 8735H

## (undated) DEVICE — SYR 50ML LR LCK 1ML GRAD NSAF --

## (undated) DEVICE — INFECTION CONTROL KIT SYS

## (undated) DEVICE — Device

## (undated) DEVICE — SOLUTION IV 1000ML PH 7.4 INJ NRMSOL R

## (undated) DEVICE — 40418 TRENDELENBURG ONE-STEP ARM PROTECTORS LARGE (1 PAIR): Brand: 40418 TRENDELENBURG ONE-STEP ARM PROTECTORS LARGE (1 PAIR)

## (undated) DEVICE — AVID DUAL STAGE VENOUS DRAINAGE CANNULA: Brand: AVID DUAL STAGE VENOUS DRAINAGE CANNULA

## (undated) DEVICE — SUTURE N ABSRB MONOFILAMENT 4-0 SH1 36 IN BLU PROLENE D3986

## (undated) DEVICE — MEDI-TRACE CADENCE ADULT, DEFIBRILLATION ELECTRODE -RTS  (10 PR/PK) - PHILIPS: Brand: MEDI-TRACE CADENCE

## (undated) DEVICE — SUTURE MCRYL SZ 3-0 L27IN ABSRB UD L24MM PS-1 3/8 CIR PRIM Y936H

## (undated) DEVICE — SENSOR TEMP SKIN DISP

## (undated) DEVICE — PROCEDURE KIT FLUID MGMT CUST MAINFOLD STRL

## (undated) DEVICE — KIT,ANTI FOG,W/SPONGE & FLUID,SOFT PACK: Brand: MEDLINE

## (undated) DEVICE — CONNECTOR DRNGE W3/8-0.5XH3/16XL3/16IN 2:1 SIL CARD STR

## (undated) DEVICE — 6 FOOT DISPOSABLE EXTENSION CABLE WITH SAFE CONNECT / SCREW-DOWN

## (undated) DEVICE — SOL IRR STRL H2O 1000ML BTL --

## (undated) DEVICE — STERILE POLYISOPRENE POWDER-FREE SURGICAL GLOVES WITH EMOLLIENT COATING: Brand: PROTEXIS

## (undated) DEVICE — PINNACLE INTRODUCER SHEATH: Brand: PINNACLE

## (undated) DEVICE — TRANSFER PK BLD PROD 300ML --

## (undated) DEVICE — CATH DIAG-D 6F MULTI PIG 155 5 -- IMPULSE 16599-302

## (undated) DEVICE — STRAP,POSITIONING,KNEE/BODY,FOAM,4X60": Brand: MEDLINE

## (undated) DEVICE — PACK PROCEDURE SURG HRT CATH

## (undated) DEVICE — DERMABOND SKIN ADH 0.7ML -- DERMABOND ADVANCED 12/BX

## (undated) DEVICE — ANGIO-SEAL VIP VASCULAR CLOSURE DEVICE: Brand: ANGIO-SEAL

## (undated) DEVICE — CATHETER IV 14GA L2IN POLYUR STR ORNG HUB SFTY RADPQ DISP

## (undated) DEVICE — NEEDLE HYPO 18GA L1.5IN PNK S STL HUB POLYPR SHLD REG BVL

## (undated) DEVICE — SUT PROL 3-0 30IN SH1 DA BLU --

## (undated) DEVICE — PREP SKN CHLRAPRP APL 26ML STR --

## (undated) DEVICE — GOWN,SIRUS,NONRNF,SETINSLV,XL,20/CS: Brand: MEDLINE

## (undated) DEVICE — STERILE POLYISOPRENE POWDER-FREE SURGICAL GLOVES: Brand: PROTEXIS

## (undated) DEVICE — PLEDGET SUT SFT OVL 3 8X5 16IN

## (undated) DEVICE — 72" ARTERIAL PRESSURE TUBING: Brand: ICU MEDICAL

## (undated) DEVICE — TTL1LYR 16FR10ML 100%SIL TMPST TR: Brand: MEDLINE

## (undated) DEVICE — TIDISHIELD TRANSPORT, CONTAINMENT COVER FOR BACK TABLE 4'6" (1.37M) TO 8' (2.43M) IN LENGTH: Brand: TIDISHIELD

## (undated) DEVICE — PLEDGET SURG W3/16XL0.25IN THK1.65MM PTFE OVL FELT FOR THE

## (undated) DEVICE — Device: Brand: CLEANCUT ROTATING AORTIC PUNCH

## (undated) DEVICE — SYR 3ML LL TIP 1/10ML GRAD --

## (undated) DEVICE — CANNULA PERF 15FR L12.5IN RG STPCOCK WIREWOUND BODY

## (undated) DEVICE — SUTURE TICRON DBL ARMED 2 0 CV 305 42IN BLU N ABSRB BRAID 8886303551

## (undated) DEVICE — PAD,ABDOMINAL,5"X9",ST,LF,25/BX: Brand: MEDLINE INDUSTRIES, INC.

## (undated) DEVICE — 1000ML,PRESSURE INFUSER W/STOPCOCK: Brand: MEDLINE

## (undated) DEVICE — PRESSURE MONITORING SET: Brand: TRUWAVE

## (undated) DEVICE — TBG INSUFFLATION LUER LOCK: Brand: MEDLINE INDUSTRIES, INC.

## (undated) DEVICE — TUBING, SUCTION, 1/4" X 12', STRAIGHT: Brand: MEDLINE

## (undated) DEVICE — COVER LT HNDL PLAS RIG 1 PER PK

## (undated) DEVICE — INTENDED TO STANDARDIZE OR CAMERAS TO ALLOW FOR THE USE OF THE OR CAMERA COVER: Brand: ASPEN® O.R. CAMERA COVER

## (undated) DEVICE — INTENDED FOR TISSUE SEPARATION, AND OTHER PROCEDURES THAT REQUIRE A SHARP SURGICAL BLADE TO PUNCTURE OR CUT.: Brand: BARD-PARKER ® CARBON RIB-BACK BLADES

## (undated) DEVICE — SUT PROL 7-0 24IN BV1 DA BLU --

## (undated) DEVICE — DRAPE FLD WRM W44XL66IN C6L FOR INTRATEMP SYS THERMABASIN

## (undated) DEVICE — STERNUM BLADE, OFFSET (31.7 X 0.64 X 6.3MM)

## (undated) DEVICE — REM POLYHESIVE ADULT PATIENT RETURN ELECTRODE: Brand: VALLEYLAB

## (undated) DEVICE — CONNECTOR TBNG SUCTION 3/8X3/8X3/8 IN

## (undated) DEVICE — SUTURE VCRL 1 L27IN ABSRB VLT TP-1 L65MM 1/2 CIR TAPERPOINT J650G

## (undated) DEVICE — CANNULA SUCT L8.5IN DIA20FR VENT CONN 3/8IN ART MTL CRV TIP

## (undated) DEVICE — SOL IRR SOD CL 0.9% 1000ML BTL --

## (undated) DEVICE — DRSG BORDR MPLX HEEL 8.7X9.1IN --

## (undated) DEVICE — SYR 10ML LUER LOK 1/5ML GRAD --

## (undated) DEVICE — 3M™ TEGADERM™ TRANSPARENT FILM DRESSING FRAME STYLE, 1626W, 4 IN X 4-3/4 IN (10 CM X 12 CM), 50/CT 4CT/CASE: Brand: 3M™ TEGADERM™

## (undated) DEVICE — FOGARTY - HYDRAGRIP SURGICAL - CLAMP INSERTS: Brand: FOGARTY SOFTJAW

## (undated) DEVICE — BLADE OPHTH W1.5MM 15DEG ORNG HNDL SHRP SHRP DEL FOR CATRCT

## (undated) DEVICE — SPONGE GZ W4XL4IN COT 12 PLY TYP VII WVN C FLD DSGN

## (undated) DEVICE — CLIP INT SM WIDE RED TI TRNSVRS GRV CHEVRON SHP W PRECIS

## (undated) DEVICE — SUT PROL 6-0 24IN BV1 DA BLU --

## (undated) DEVICE — BANDAGE COMPR M W6INXL10YD WHT BGE VELC E MTRX HK AND LOOP